# Patient Record
Sex: MALE | Race: WHITE | Employment: FULL TIME | URBAN - METROPOLITAN AREA
[De-identification: names, ages, dates, MRNs, and addresses within clinical notes are randomized per-mention and may not be internally consistent; named-entity substitution may affect disease eponyms.]

---

## 2021-03-03 ENCOUNTER — OFFICE VISIT (OUTPATIENT)
Dept: INTERNAL MEDICINE CLINIC | Facility: CLINIC | Age: 61
End: 2021-03-03
Payer: COMMERCIAL

## 2021-03-03 VITALS
DIASTOLIC BLOOD PRESSURE: 82 MMHG | HEIGHT: 71 IN | SYSTOLIC BLOOD PRESSURE: 146 MMHG | BODY MASS INDEX: 33.18 KG/M2 | RESPIRATION RATE: 20 BRPM | HEART RATE: 112 BPM | WEIGHT: 237 LBS

## 2021-03-03 DIAGNOSIS — L30.9 DERMATITIS: ICD-10-CM

## 2021-03-03 DIAGNOSIS — E78.5 HYPERLIPIDEMIA, UNSPECIFIED HYPERLIPIDEMIA TYPE: ICD-10-CM

## 2021-03-03 DIAGNOSIS — Z12.11 COLON CANCER SCREENING: ICD-10-CM

## 2021-03-03 DIAGNOSIS — Z00.00 ROUTINE PHYSICAL EXAMINATION: Primary | ICD-10-CM

## 2021-03-03 DIAGNOSIS — Z13.6 SCREENING FOR HEART DISEASE: ICD-10-CM

## 2021-03-03 DIAGNOSIS — R03.0 ELEVATED BLOOD PRESSURE READING: ICD-10-CM

## 2021-03-03 PROCEDURE — 3077F SYST BP >= 140 MM HG: CPT | Performed by: INTERNAL MEDICINE

## 2021-03-03 PROCEDURE — 99386 PREV VISIT NEW AGE 40-64: CPT | Performed by: INTERNAL MEDICINE

## 2021-03-03 PROCEDURE — 3008F BODY MASS INDEX DOCD: CPT | Performed by: INTERNAL MEDICINE

## 2021-03-03 PROCEDURE — 3079F DIAST BP 80-89 MM HG: CPT | Performed by: INTERNAL MEDICINE

## 2021-03-03 RX ORDER — CLOTRIMAZOLE AND BETAMETHASONE DIPROPIONATE 10; .64 MG/G; MG/G
1 CREAM TOPICAL 2 TIMES DAILY PRN
Qty: 45 G | Refills: 3 | Status: SHIPPED | OUTPATIENT
Start: 2021-03-03 | End: 2021-11-05

## 2021-03-03 NOTE — PROGRESS NOTES
HPI:    Patient ID: Shivani Augustine is a 56year old male. HPI  Patient is here requesting a physical exam.  I last saw him in 2015.   At that time blood work showed mild elevated blood sugar but considerable hyperlipidemia with a total cholesterol 289 and fever.   HENT: Negative for hearing loss. Eyes: Negative for visual disturbance. Respiratory: Negative for cough and shortness of breath. Cardiovascular: Negative for chest pain and palpitations.    Gastrointestinal: Negative for nausea, vomiting, a inguinal adenopathy present. Neurological: He is alert. No cranial nerve deficit. Coordination normal.   Skin: Skin is warm and dry. Rash noted. Both hands are dried with cracking and redness and scaliness. Red dry rash medial side of both calves.    P no symptoms. Check cardiac calcium scan to look for asymptomatic calcium buildup in the coronary arteries. - CT CALCIUM SCORING; Future    6. Colon cancer screening  Discussed the importance of colon cancer screening.   We will need to get through all the

## 2021-03-17 ENCOUNTER — LAB ENCOUNTER (OUTPATIENT)
Dept: LAB | Facility: HOSPITAL | Age: 61
End: 2021-03-17
Attending: INTERNAL MEDICINE
Payer: COMMERCIAL

## 2021-03-17 DIAGNOSIS — Z00.00 ROUTINE PHYSICAL EXAMINATION: ICD-10-CM

## 2021-03-17 LAB
ALBUMIN SERPL-MCNC: 4.2 G/DL (ref 3.4–5)
ALBUMIN/GLOB SERPL: 1.2 {RATIO} (ref 1–2)
ALP LIVER SERPL-CCNC: 53 U/L
ALT SERPL-CCNC: 35 U/L
ANION GAP SERPL CALC-SCNC: 3 MMOL/L (ref 0–18)
AST SERPL-CCNC: 14 U/L (ref 15–37)
BASOPHILS # BLD AUTO: 0.07 X10(3) UL (ref 0–0.2)
BASOPHILS NFR BLD AUTO: 0.8 %
BILIRUB SERPL-MCNC: 0.4 MG/DL (ref 0.1–2)
BUN BLD-MCNC: 13 MG/DL (ref 7–18)
BUN/CREAT SERPL: 13.4 (ref 10–20)
CALCIUM BLD-MCNC: 8.9 MG/DL (ref 8.5–10.1)
CHLORIDE SERPL-SCNC: 108 MMOL/L (ref 98–112)
CHOLEST SMN-MCNC: 258 MG/DL (ref ?–200)
CO2 SERPL-SCNC: 30 MMOL/L (ref 21–32)
COMPLEXED PSA SERPL-MCNC: 1.13 NG/ML (ref ?–4)
CREAT BLD-MCNC: 0.97 MG/DL
DEPRECATED RDW RBC AUTO: 44.2 FL (ref 35.1–46.3)
EOSINOPHIL # BLD AUTO: 0.26 X10(3) UL (ref 0–0.7)
EOSINOPHIL NFR BLD AUTO: 3.1 %
ERYTHROCYTE [DISTWIDTH] IN BLOOD BY AUTOMATED COUNT: 13.1 % (ref 11–15)
GLOBULIN PLAS-MCNC: 3.6 G/DL (ref 2.8–4.4)
GLUCOSE BLD-MCNC: 100 MG/DL (ref 70–99)
HCT VFR BLD AUTO: 49.3 %
HDLC SERPL-MCNC: 38 MG/DL (ref 40–59)
HGB BLD-MCNC: 16.4 G/DL
IMM GRANULOCYTES # BLD AUTO: 0.04 X10(3) UL (ref 0–1)
IMM GRANULOCYTES NFR BLD: 0.5 %
LDLC SERPL CALC-MCNC: 191 MG/DL (ref ?–100)
LYMPHOCYTES # BLD AUTO: 1.84 X10(3) UL (ref 1–4)
LYMPHOCYTES NFR BLD AUTO: 21.6 %
M PROTEIN MFR SERPL ELPH: 7.8 G/DL (ref 6.4–8.2)
MCH RBC QN AUTO: 30.5 PG (ref 26–34)
MCHC RBC AUTO-ENTMCNC: 33.3 G/DL (ref 31–37)
MCV RBC AUTO: 91.6 FL
MONOCYTES # BLD AUTO: 0.77 X10(3) UL (ref 0.1–1)
MONOCYTES NFR BLD AUTO: 9 %
NEUTROPHILS # BLD AUTO: 5.53 X10 (3) UL (ref 1.5–7.7)
NEUTROPHILS # BLD AUTO: 5.53 X10(3) UL (ref 1.5–7.7)
NEUTROPHILS NFR BLD AUTO: 65 %
NONHDLC SERPL-MCNC: 220 MG/DL (ref ?–130)
OSMOLALITY SERPL CALC.SUM OF ELEC: 292 MOSM/KG (ref 275–295)
PATIENT FASTING Y/N/NP: YES
PATIENT FASTING Y/N/NP: YES
PLATELET # BLD AUTO: 277 10(3)UL (ref 150–450)
POTASSIUM SERPL-SCNC: 4.3 MMOL/L (ref 3.5–5.1)
RBC # BLD AUTO: 5.38 X10(6)UL
SODIUM SERPL-SCNC: 141 MMOL/L (ref 136–145)
TRIGL SERPL-MCNC: 144 MG/DL (ref 30–149)
TSI SER-ACNC: 2.04 MIU/ML (ref 0.36–3.74)
VLDLC SERPL CALC-MCNC: 29 MG/DL (ref 0–30)
WBC # BLD AUTO: 8.5 X10(3) UL (ref 4–11)

## 2021-03-17 PROCEDURE — 36415 COLL VENOUS BLD VENIPUNCTURE: CPT

## 2021-03-17 PROCEDURE — 84443 ASSAY THYROID STIM HORMONE: CPT

## 2021-03-17 PROCEDURE — 80053 COMPREHEN METABOLIC PANEL: CPT

## 2021-03-17 PROCEDURE — 80061 LIPID PANEL: CPT

## 2021-03-17 PROCEDURE — 85025 COMPLETE CBC W/AUTO DIFF WBC: CPT

## 2021-03-18 DIAGNOSIS — E78.5 HYPERLIPIDEMIA, UNSPECIFIED HYPERLIPIDEMIA TYPE: Primary | ICD-10-CM

## 2021-03-18 RX ORDER — ATORVASTATIN CALCIUM 10 MG/1
10 TABLET, FILM COATED ORAL NIGHTLY
Qty: 30 TABLET | Refills: 3 | Status: SHIPPED | OUTPATIENT
Start: 2021-03-18 | End: 2021-06-21

## 2021-03-24 ENCOUNTER — EKG ENCOUNTER (OUTPATIENT)
Dept: LAB | Facility: HOSPITAL | Age: 61
End: 2021-03-24
Attending: INTERNAL MEDICINE
Payer: COMMERCIAL

## 2021-03-24 DIAGNOSIS — Z00.00 ROUTINE GENERAL MEDICAL EXAMINATION AT A HEALTH CARE FACILITY: Primary | ICD-10-CM

## 2021-03-24 PROCEDURE — 93010 ELECTROCARDIOGRAM REPORT: CPT | Performed by: INTERNAL MEDICINE

## 2021-03-24 PROCEDURE — 93005 ELECTROCARDIOGRAM TRACING: CPT

## 2021-06-16 ENCOUNTER — LAB ENCOUNTER (OUTPATIENT)
Dept: LAB | Facility: HOSPITAL | Age: 61
End: 2021-06-16
Attending: NURSE PRACTITIONER
Payer: COMMERCIAL

## 2021-06-16 DIAGNOSIS — E78.5 HYPERLIPIDEMIA, UNSPECIFIED HYPERLIPIDEMIA TYPE: ICD-10-CM

## 2021-06-16 DIAGNOSIS — Z00.00 ROUTINE GENERAL MEDICAL EXAMINATION AT A HEALTH CARE FACILITY: Primary | ICD-10-CM

## 2021-06-16 PROCEDURE — 93005 ELECTROCARDIOGRAM TRACING: CPT

## 2021-06-16 PROCEDURE — 80053 COMPREHEN METABOLIC PANEL: CPT

## 2021-06-16 PROCEDURE — 36415 COLL VENOUS BLD VENIPUNCTURE: CPT

## 2021-06-16 PROCEDURE — 93010 ELECTROCARDIOGRAM REPORT: CPT | Performed by: INTERNAL MEDICINE

## 2021-06-16 PROCEDURE — 80061 LIPID PANEL: CPT

## 2021-06-17 ENCOUNTER — PATIENT MESSAGE (OUTPATIENT)
Dept: INTERNAL MEDICINE CLINIC | Facility: CLINIC | Age: 61
End: 2021-06-17

## 2021-06-17 DIAGNOSIS — E78.5 HYPERLIPIDEMIA, UNSPECIFIED HYPERLIPIDEMIA TYPE: Primary | ICD-10-CM

## 2021-06-17 NOTE — TELEPHONE ENCOUNTER
Alessia Olson: please review. Please advise when he should have lipid panel? Or if it can be added to specimen?

## 2021-06-17 NOTE — TELEPHONE ENCOUNTER
From: Raudel Sorto  To: Tera Ayoub MD  Sent: 6/17/2021 9:40 AM CDT  Subject: Test Results Question    When I had the lab work done yesterday, I thought it would include the cholesterol results too.  Is that a separate test? I just completed 3 months

## 2021-06-17 NOTE — TELEPHONE ENCOUNTER
Please call the lab and make sure a lipid panel was added.  The order was placed    Merlin Pereyra, ANP  Working with [facility] [place]

## 2021-06-21 RX ORDER — ATORVASTATIN CALCIUM 10 MG/1
10 TABLET, FILM COATED ORAL NIGHTLY
Qty: 30 TABLET | Refills: 3 | Status: SHIPPED | OUTPATIENT
Start: 2021-06-21 | End: 2021-10-06

## 2021-06-21 NOTE — TELEPHONE ENCOUNTER
•  atorvastatin 10 MG Oral Tab, Take 1 tablet (10 mg total) by mouth nightly., Disp: 30 tablet, Rfl: 3    Message from Pharmacy:  Pt is requesting a 90 day supply

## 2021-06-21 NOTE — TELEPHONE ENCOUNTER
Refill passed per [place] [employer] [place] protocol.   Cholesterol Medications  Protocol Criteria:  Appointment scheduled in the past 12 months or in the next 3 months  ALT & LDL on file in the past 12 months  ALT result < 80  LDL result <130   Recent Outpatient Vis

## 2021-07-14 ENCOUNTER — HOSPITAL ENCOUNTER (OUTPATIENT)
Dept: CT IMAGING | Facility: HOSPITAL | Age: 61
Discharge: HOME | End: 2021-07-14
Attending: INTERNAL MEDICINE

## 2021-07-14 DIAGNOSIS — Z13.6 SCREENING FOR HEART DISEASE: ICD-10-CM

## 2021-10-06 RX ORDER — ATORVASTATIN CALCIUM 10 MG/1
10 TABLET, FILM COATED ORAL NIGHTLY
Qty: 90 TABLET | Refills: 1 | Status: SHIPPED | OUTPATIENT
Start: 2021-10-06 | End: 2022-04-05

## 2021-10-06 NOTE — TELEPHONE ENCOUNTER
Refill passed per [place] [employer] [facility] protocol.       Requested Prescriptions   Pending Prescriptions Disp Refills    ATORVASTATIN 10 MG Oral Tab [Pharmacy Med Name: ATORVASTATIN 10MG TABLETS] 90 tablet 0     Sig: TAKE 1 TABLET(10 MG) BY MOUTH EVERY NIGHT        Cholesterol Medication Protocol Passed - 10/6/2021  3:35 PM        Passed - ALT in past 12 months        Passed - LDL in past 12 months        Passed - Last ALT < 80       Lab Results   Component Value Date    ALT 55 06/16/2021             Passed - Last LDL < 130     Lab Results   Component Value Date     (H) 06/16/2021               Passed - Appointment in past 12 or next 3 months                   Recent Outpatient Visits              7 months ago Routine physical examination    Adalberto Baker MD    Office Visit    6 years ago Routine physical examination    [place], [place], Skyler Edwards MD    Office Visit

## 2021-11-05 RX ORDER — CLOTRIMAZOLE AND BETAMETHASONE DIPROPIONATE 10; .64 MG/G; MG/G
CREAM TOPICAL
Qty: 45 G | Refills: 3 | Status: SHIPPED | OUTPATIENT
Start: 2021-11-05

## 2021-12-22 ENCOUNTER — APPOINTMENT (OUTPATIENT)
Dept: CT IMAGING | Facility: HOSPITAL | Age: 61
End: 2021-12-22
Attending: EMERGENCY MEDICINE
Payer: COMMERCIAL

## 2021-12-22 ENCOUNTER — HOSPITAL ENCOUNTER (EMERGENCY)
Facility: HOSPITAL | Age: 61
Discharge: HOME | End: 2021-12-22
Attending: EMERGENCY MEDICINE
Payer: COMMERCIAL

## 2021-12-22 VITALS
DIASTOLIC BLOOD PRESSURE: 70 MMHG | TEMPERATURE: 98 F | SYSTOLIC BLOOD PRESSURE: 134 MMHG | RESPIRATION RATE: 20 BRPM | HEART RATE: 101 BPM | WEIGHT: 230 LBS | OXYGEN SATURATION: 95 % | BODY MASS INDEX: 32 KG/M2

## 2021-12-22 DIAGNOSIS — N20.1 LEFT URETERAL STONE: Primary | ICD-10-CM

## 2021-12-22 PROCEDURE — 74176 CT ABD & PELVIS W/O CONTRAST: CPT | Performed by: EMERGENCY MEDICINE

## 2021-12-22 PROCEDURE — 80048 BASIC METABOLIC PNL TOTAL CA: CPT

## 2021-12-22 PROCEDURE — 96375 TX/PRO/DX INJ NEW DRUG ADDON: CPT

## 2021-12-22 PROCEDURE — 85025 COMPLETE CBC W/AUTO DIFF WBC: CPT

## 2021-12-22 PROCEDURE — 80048 BASIC METABOLIC PNL TOTAL CA: CPT | Performed by: EMERGENCY MEDICINE

## 2021-12-22 PROCEDURE — 81001 URINALYSIS AUTO W/SCOPE: CPT | Performed by: EMERGENCY MEDICINE

## 2021-12-22 PROCEDURE — 96374 THER/PROPH/DIAG INJ IV PUSH: CPT

## 2021-12-22 PROCEDURE — 96361 HYDRATE IV INFUSION ADD-ON: CPT

## 2021-12-22 PROCEDURE — 99284 EMERGENCY DEPT VISIT MOD MDM: CPT

## 2021-12-22 PROCEDURE — 85025 COMPLETE CBC W/AUTO DIFF WBC: CPT | Performed by: EMERGENCY MEDICINE

## 2021-12-22 RX ORDER — KETOROLAC TROMETHAMINE 15 MG/ML
15 INJECTION, SOLUTION INTRAMUSCULAR; INTRAVENOUS ONCE
Status: COMPLETED | OUTPATIENT
Start: 2021-12-22 | End: 2021-12-22

## 2021-12-22 RX ORDER — ONDANSETRON 2 MG/ML
INJECTION INTRAMUSCULAR; INTRAVENOUS
Status: COMPLETED
Start: 2021-12-22 | End: 2021-12-22

## 2021-12-22 RX ORDER — KETOROLAC TROMETHAMINE 15 MG/ML
INJECTION, SOLUTION INTRAMUSCULAR; INTRAVENOUS
Status: COMPLETED
Start: 2021-12-22 | End: 2021-12-22

## 2021-12-22 RX ORDER — ONDANSETRON 2 MG/ML
4 INJECTION INTRAMUSCULAR; INTRAVENOUS ONCE
Status: COMPLETED | OUTPATIENT
Start: 2021-12-22 | End: 2021-12-22

## 2021-12-22 RX ORDER — TRAMADOL HYDROCHLORIDE 50 MG/1
TABLET ORAL EVERY 6 HOURS PRN
Qty: 10 TABLET | Refills: 0 | Status: SHIPPED | OUTPATIENT
Start: 2021-12-22 | End: 2021-12-27

## 2021-12-22 NOTE — ED INITIAL ASSESSMENT (HPI)
Patient complains of left sided flank pain since this am. Hx kidney stones. Associated with nausea. Denies urinary complaints.

## 2021-12-22 NOTE — ED PROVIDER NOTES
Patient Seen in: [facility] AND [facility] Emergency Department    History   Patient presents with:  Abdomen/Flank Pain      HPI    58-year-old male presents the ER with complaint of left lower quadrant pain. Patient states the pain started early last night.   Trisha Manning including droplet mask, eye protection, and gloves were worn throughout the duration of the exam.  Handwashing was performed prior to and after the exam.  Stethoscope and any equipment used during my examination was cleaned with super sani-cloth germicidal 2.0 (*)     RBC Urine >10 (*)     Squamous Epi.  Cells Few (*)     All other components within normal limits   CBC W/ DIFFERENTIAL - Abnormal; Notable for the following components:    WBC 13.3 (*)     Neutrophil Absolute Prelim 11.11 (*)     [facility]rophil [facility]so [facility]) injection 4 mg (4 mg Intravenous Given 12/22/21 1305)   ketorolac (TORADOL) injection 15 mg (15 mg Intravenous Given 12/22/21 1421)   sodium chloride 0.9% IV bolus 1,000 mL (1,000 mL Intravenous New Bag 12/22/21 1427)         MDM      12/22/21  12

## 2021-12-23 ENCOUNTER — HOSPITAL ENCOUNTER (EMERGENCY)
Facility: HOSPITAL | Age: 61
Discharge: HOME | End: 2021-12-24
Attending: EMERGENCY MEDICINE
Payer: COMMERCIAL

## 2021-12-23 DIAGNOSIS — N23 RENAL COLIC: ICD-10-CM

## 2021-12-23 DIAGNOSIS — N20.1 URETEROLITHIASIS: Primary | ICD-10-CM

## 2021-12-23 PROCEDURE — 96374 THER/PROPH/DIAG INJ IV PUSH: CPT

## 2021-12-23 PROCEDURE — 96375 TX/PRO/DX INJ NEW DRUG ADDON: CPT

## 2021-12-23 PROCEDURE — 99284 EMERGENCY DEPT VISIT MOD MDM: CPT

## 2021-12-23 RX ORDER — MORPHINE SULFATE 4 MG/ML
4 INJECTION, SOLUTION INTRAMUSCULAR; INTRAVENOUS ONCE
Status: COMPLETED | OUTPATIENT
Start: 2021-12-23 | End: 2021-12-24

## 2021-12-23 RX ORDER — KETOROLAC TROMETHAMINE 15 MG/ML
15 INJECTION, SOLUTION INTRAMUSCULAR; INTRAVENOUS ONCE
Status: COMPLETED | OUTPATIENT
Start: 2021-12-23 | End: 2021-12-24

## 2021-12-24 VITALS
RESPIRATION RATE: 18 BRPM | WEIGHT: 230 LBS | OXYGEN SATURATION: 94 % | DIASTOLIC BLOOD PRESSURE: 78 MMHG | BODY MASS INDEX: 32.2 KG/M2 | TEMPERATURE: 98 F | HEART RATE: 99 BPM | HEIGHT: 71 IN | SYSTOLIC BLOOD PRESSURE: 143 MMHG

## 2021-12-24 RX ORDER — HYDROCODONE BITARTRATE AND ACETAMINOPHEN 5; 325 MG/1; MG/1
2 TABLET ORAL ONCE
Status: COMPLETED | OUTPATIENT
Start: 2021-12-24 | End: 2021-12-24

## 2021-12-24 RX ORDER — HYDROCODONE BITARTRATE AND ACETAMINOPHEN 5; 325 MG/1; MG/1
1-2 TABLET ORAL EVERY 6 HOURS PRN
Qty: 20 TABLET | Refills: 0 | Status: SHIPPED | OUTPATIENT
Start: 2021-12-24

## 2021-12-24 RX ORDER — ONDANSETRON 2 MG/ML
4 INJECTION INTRAMUSCULAR; INTRAVENOUS ONCE
Status: COMPLETED | OUTPATIENT
Start: 2021-12-24 | End: 2021-12-24

## 2021-12-24 RX ORDER — KETOROLAC TROMETHAMINE 10 MG/1
10 TABLET, FILM COATED ORAL EVERY 6 HOURS PRN
Qty: 20 TABLET | Refills: 0 | Status: SHIPPED | OUTPATIENT
Start: 2021-12-24 | End: 2021-12-29

## 2021-12-24 NOTE — ED INITIAL ASSESSMENT (HPI)
Pt was in ER yesterday and diagnosed with kidney stones. Was sent home with pain medication however is back d/t not being able to tolerate pain.     Pt also states that he has the urge to urinate however only goes a little every time he goes

## 2021-12-24 NOTE — ED QUICK NOTES
Patient provided with discharge instructions. Verbalized understanding for plan of care at home and follow up. All question and concerns addressed prior to discharge. Iv removed, catheter intact. Let pt know rx was sent to pharmacy.

## 2021-12-25 NOTE — ED PROVIDER NOTES
Patient Seen in: [facility] AND [facility] Emergency Department    History   Patient presents with:  Abdomen/Flank Pain      HPI    The patient presents to the ED complaining of unbearable kidney stone pain.   Seen yesterday for initial symptoms and was diagnosed including droplet mask, eye protection, and gloves were worn throughout the duration of the exam.  Handwashing was performed prior to and after the exam.  Stethoscope and any equipment used during my examination was cleaned with super sani-cloth germicidal Oral      SpO2: 92% 93% 92% 94%   Weight: 104.3 kg      Height: 180.3 cm (5' 11\")        *I personally reviewed and interpreted all ED vitals.     Pulse Ox: 94%, Room air, Normal     Monitor Interpretation:   normal sinus rhythm, sinus tachycardia    Diffe

## 2022-04-05 RX ORDER — ATORVASTATIN CALCIUM 10 MG/1
TABLET, FILM COATED ORAL
Qty: 90 TABLET | Refills: 0 | Status: SHIPPED | OUTPATIENT
Start: 2022-04-05 | End: 2022-07-04

## 2022-05-02 ENCOUNTER — OFFICE VISIT (OUTPATIENT)
Dept: INTERNAL MEDICINE CLINIC | Facility: CLINIC | Age: 62
End: 2022-05-02
Payer: COMMERCIAL

## 2022-05-02 ENCOUNTER — LAB ENCOUNTER (OUTPATIENT)
Dept: LAB | Facility: HOSPITAL | Age: 62
End: 2022-05-02
Attending: INTERNAL MEDICINE
Payer: COMMERCIAL

## 2022-05-02 VITALS
BODY MASS INDEX: 33.04 KG/M2 | TEMPERATURE: 99 F | WEIGHT: 236 LBS | HEART RATE: 110 BPM | DIASTOLIC BLOOD PRESSURE: 68 MMHG | RESPIRATION RATE: 20 BRPM | HEIGHT: 71 IN | SYSTOLIC BLOOD PRESSURE: 152 MMHG

## 2022-05-02 DIAGNOSIS — E78.5 HYPERLIPIDEMIA, UNSPECIFIED HYPERLIPIDEMIA TYPE: ICD-10-CM

## 2022-05-02 DIAGNOSIS — Z00.00 ROUTINE PHYSICAL EXAMINATION: ICD-10-CM

## 2022-05-02 DIAGNOSIS — Z12.11 COLON CANCER SCREENING: ICD-10-CM

## 2022-05-02 DIAGNOSIS — F17.200 TOBACCO USE DISORDER: ICD-10-CM

## 2022-05-02 DIAGNOSIS — Z00.00 ROUTINE PHYSICAL EXAMINATION: Primary | ICD-10-CM

## 2022-05-02 DIAGNOSIS — R03.0 ELEVATED BLOOD PRESSURE READING: ICD-10-CM

## 2022-05-02 LAB
ALBUMIN SERPL-MCNC: 4.1 G/DL (ref 3.4–5)
ALBUMIN/GLOB SERPL: 0.9 {RATIO} (ref 1–2)
ALP LIVER SERPL-CCNC: 43 U/L
ALT SERPL-CCNC: 44 U/L
ANION GAP SERPL CALC-SCNC: 4 MMOL/L (ref 0–18)
AST SERPL-CCNC: 18 U/L (ref 15–37)
BASOPHILS # BLD AUTO: 0.07 X10(3) UL (ref 0–0.2)
BASOPHILS NFR BLD AUTO: 0.9 %
BILIRUB SERPL-MCNC: 0.4 MG/DL (ref 0.1–2)
BUN BLD-MCNC: 11 MG/DL (ref 7–18)
BUN/CREAT SERPL: 10.6 (ref 10–20)
CALCIUM BLD-MCNC: 9.4 MG/DL (ref 8.5–10.1)
CHLORIDE SERPL-SCNC: 107 MMOL/L (ref 98–112)
CHOLEST SERPL-MCNC: 198 MG/DL (ref ?–200)
CO2 SERPL-SCNC: 30 MMOL/L (ref 21–32)
COMPLEXED PSA SERPL-MCNC: 2.2 NG/ML (ref ?–4)
CREAT BLD-MCNC: 1.04 MG/DL
DEPRECATED RDW RBC AUTO: 43.4 FL (ref 35.1–46.3)
EOSINOPHIL # BLD AUTO: 0.16 X10(3) UL (ref 0–0.7)
EOSINOPHIL NFR BLD AUTO: 2.1 %
ERYTHROCYTE [DISTWIDTH] IN BLOOD BY AUTOMATED COUNT: 12.6 % (ref 11–15)
FASTING PATIENT LIPID ANSWER: YES
FASTING STATUS PATIENT QL REPORTED: YES
GLOBULIN PLAS-MCNC: 4.5 G/DL (ref 2.8–4.4)
GLUCOSE BLD-MCNC: 106 MG/DL (ref 70–99)
HCT VFR BLD AUTO: 48.8 %
HDLC SERPL-MCNC: 40 MG/DL (ref 40–59)
HGB BLD-MCNC: 16.1 G/DL
IMM GRANULOCYTES # BLD AUTO: 0.03 X10(3) UL (ref 0–1)
IMM GRANULOCYTES NFR BLD: 0.4 %
LDLC SERPL CALC-MCNC: 133 MG/DL (ref ?–100)
LYMPHOCYTES # BLD AUTO: 1.87 X10(3) UL (ref 1–4)
LYMPHOCYTES NFR BLD AUTO: 25.1 %
MCH RBC QN AUTO: 30.6 PG (ref 26–34)
MCHC RBC AUTO-ENTMCNC: 33 G/DL (ref 31–37)
MCV RBC AUTO: 92.6 FL
MONOCYTES # BLD AUTO: 0.87 X10(3) UL (ref 0.1–1)
MONOCYTES NFR BLD AUTO: 11.7 %
NEUTROPHILS # BLD AUTO: 4.46 X10 (3) UL (ref 1.5–7.7)
NEUTROPHILS # BLD AUTO: 4.46 X10(3) UL (ref 1.5–7.7)
NEUTROPHILS NFR BLD AUTO: 59.8 %
NONHDLC SERPL-MCNC: 158 MG/DL (ref ?–130)
OSMOLALITY SERPL CALC.SUM OF ELEC: 292 MOSM/KG (ref 275–295)
PLATELET # BLD AUTO: 269 10(3)UL (ref 150–450)
POTASSIUM SERPL-SCNC: 4.7 MMOL/L (ref 3.5–5.1)
PROT SERPL-MCNC: 8.6 G/DL (ref 6.4–8.2)
RBC # BLD AUTO: 5.27 X10(6)UL
SODIUM SERPL-SCNC: 141 MMOL/L (ref 136–145)
TRIGL SERPL-MCNC: 141 MG/DL (ref 30–149)
TSI SER-ACNC: 2.66 MIU/ML (ref 0.36–3.74)
VIT B12 SERPL-MCNC: 290 PG/ML (ref 193–986)
VLDLC SERPL CALC-MCNC: 26 MG/DL (ref 0–30)
WBC # BLD AUTO: 7.5 X10(3) UL (ref 4–11)

## 2022-05-02 PROCEDURE — 85025 COMPLETE CBC W/AUTO DIFF WBC: CPT

## 2022-05-02 PROCEDURE — 82607 VITAMIN B-12: CPT

## 2022-05-02 PROCEDURE — 3077F SYST BP >= 140 MM HG: CPT | Performed by: INTERNAL MEDICINE

## 2022-05-02 PROCEDURE — 80053 COMPREHEN METABOLIC PANEL: CPT

## 2022-05-02 PROCEDURE — 84443 ASSAY THYROID STIM HORMONE: CPT

## 2022-05-02 PROCEDURE — 80061 LIPID PANEL: CPT

## 2022-05-02 PROCEDURE — 3078F DIAST BP <80 MM HG: CPT | Performed by: INTERNAL MEDICINE

## 2022-05-02 PROCEDURE — 3008F BODY MASS INDEX DOCD: CPT | Performed by: INTERNAL MEDICINE

## 2022-05-02 PROCEDURE — 99396 PREV VISIT EST AGE 40-64: CPT | Performed by: INTERNAL MEDICINE

## 2022-05-02 PROCEDURE — 36415 COLL VENOUS BLD VENIPUNCTURE: CPT

## 2022-05-02 RX ORDER — CLOTRIMAZOLE AND BETAMETHASONE DIPROPIONATE 10; .64 MG/G; MG/G
CREAM TOPICAL
Qty: 45 G | Refills: 3 | Status: SHIPPED | OUTPATIENT
Start: 2022-05-02

## 2022-07-04 RX ORDER — ATORVASTATIN CALCIUM 10 MG/1
TABLET, FILM COATED ORAL
Qty: 90 TABLET | Refills: 1 | Status: SHIPPED | OUTPATIENT
Start: 2022-07-04

## 2022-08-02 ENCOUNTER — NURSE ONLY (OUTPATIENT)
Dept: GASTROENTEROLOGY | Facility: CLINIC | Age: 62
End: 2022-08-02

## 2022-08-02 DIAGNOSIS — Z12.11 SCREEN FOR COLON CANCER: Primary | ICD-10-CM

## 2022-08-02 RX ORDER — POLYETHYLENE GLYCOL 3350, SODIUM CHLORIDE, SODIUM BICARBONATE, POTASSIUM CHLORIDE 420; 11.2; 5.72; 1.48 G/4L; G/4L; G/4L; G/4L
POWDER, FOR SOLUTION ORAL
Qty: 4000 ML | Refills: 0 | Status: SHIPPED | OUTPATIENT
Start: 2022-08-02

## 2022-08-02 NOTE — PROGRESS NOTES
Caron Hint patient for a scheduled telephone colon screening. GI MD preference: none  Insurance:  BCBS  CBC from: 5/2/2022  PCP visit on: 5/2/2022    First colonoscopy: yes     Anticoagulants: no   Diabetic Meds:  No   BP meds(Ace inhibitors/ARB's): no  Weight loss meds (phentermine/vyvanse): no   Iron supplement (RX/OTC): no   Height & Weight/BMI: 5'11\"/236lbs/32  Hx of Cardiac/CVA issues/(MI/Stroke): no  Devices Pacemaker/Defibrillator/Stents: no   Resp. Issues/Oxygen Use/CARLOTTA/COPD: no   Issues w/Anesthesia: no     Symptoms (Y/N): no     Family history of colon cancer: no     Medications, pharmacy, and allergies verified with patient over the phone. Please advise on orders and prep, thank you.

## 2022-08-02 NOTE — PROGRESS NOTES
Scheduling:  cln w/ august w/ Dr. Aakash Stratton or Dr. Painter Batch  Dx: crc screening  trilyte split dose sent e-scribe

## 2022-08-03 NOTE — PROGRESS NOTES
Scheduled for:  Colonoscopy-screen 63480    Provider Name:  Dr. Belem Balderas  Date:  10/4/2022  Location:  [facility]  Sedation:  MAC  Time:  10:45 AM Patient made aware [facility] will call the day before with an arrival time. Prep:  Split dose trilyte E-Prescribing Status: Receipt confirmed by pharmacy (8/2/2022 12:15 PM CDT)  Meds/Allergies Reconciled?:  ronny Lin reviewed   Diagnosis with codes:  Colorectal cancer screening Z12.11  Was patient informed to call insurance with codes (Y/N):  I confirmed GVISP 1 with this patient. Referral sent?:  Referral was sent. [facility] or [address] notified?:  Electronic case request was sent to [facility] via [employer]. Medication Orders:  n/a  Misc Orders:  Patient was informed that they will need a COVID 19 test prior to their procedure. Patient verbally understood & will await a phone call from [facility] to schedule. Further instructions given by staff:  I discussed the prep instructions with the patient which he verbally understood and is aware that I will send the instructions today via [address]

## 2022-10-04 ENCOUNTER — LAB REQUISITION (OUTPATIENT)
Dept: SURGERY | Age: 62
End: 2022-10-04
Payer: COMMERCIAL

## 2022-10-04 ENCOUNTER — SURGERY CENTER DOCUMENTATION (OUTPATIENT)
Dept: SURGERY | Age: 62
End: 2022-10-04

## 2022-10-04 DIAGNOSIS — Z12.11 SPECIAL SCREENING FOR MALIGNANT NEOPLASMS, COLON: ICD-10-CM

## 2022-10-04 PROCEDURE — 45380 COLONOSCOPY AND BIOPSY: CPT | Performed by: INTERNAL MEDICINE

## 2022-10-04 PROCEDURE — 45385 COLONOSCOPY W/LESION REMOVAL: CPT | Performed by: INTERNAL MEDICINE

## 2022-10-04 PROCEDURE — 88305 TISSUE EXAM BY PATHOLOGIST: CPT | Performed by: INTERNAL MEDICINE

## 2022-10-05 ENCOUNTER — TELEPHONE (OUTPATIENT)
Dept: GASTROENTEROLOGY | Facility: CLINIC | Age: 62
End: 2022-10-05

## 2022-10-05 NOTE — TELEPHONE ENCOUNTER
Entered into Epic. Recall CLN in 3 years per Dr. Brice Duenas. Last CLN done 10/04/2022. Recall entered into Patient Outreach for 10/04/2025. Health Maintenance updated.

## 2022-10-05 NOTE — TELEPHONE ENCOUNTER
----- Message from Joe Mejía MD sent at 10/5/2022  3:33 PM CDT -----  GI staff: please place recall for colonoscopy in 3 years

## 2023-06-12 ENCOUNTER — OFFICE VISIT (OUTPATIENT)
Facility: CLINIC | Age: 63
End: 2023-06-12

## 2023-06-12 VITALS
TEMPERATURE: 99 F | RESPIRATION RATE: 20 BRPM | HEART RATE: 116 BPM | DIASTOLIC BLOOD PRESSURE: 82 MMHG | WEIGHT: 238 LBS | HEIGHT: 71 IN | BODY MASS INDEX: 33.32 KG/M2 | SYSTOLIC BLOOD PRESSURE: 150 MMHG

## 2023-06-12 DIAGNOSIS — Z00.00 ROUTINE PHYSICAL EXAMINATION: Primary | ICD-10-CM

## 2023-06-12 DIAGNOSIS — I10 ESSENTIAL HYPERTENSION: ICD-10-CM

## 2023-06-12 DIAGNOSIS — D12.6 ADENOMATOUS POLYP OF COLON, UNSPECIFIED PART OF COLON: ICD-10-CM

## 2023-06-12 DIAGNOSIS — E78.5 HYPERLIPIDEMIA, UNSPECIFIED HYPERLIPIDEMIA TYPE: ICD-10-CM

## 2023-06-12 DIAGNOSIS — F17.200 TOBACCO USE DISORDER: ICD-10-CM

## 2023-06-12 PROCEDURE — 99396 PREV VISIT EST AGE 40-64: CPT | Performed by: INTERNAL MEDICINE

## 2023-06-12 PROCEDURE — 3077F SYST BP >= 140 MM HG: CPT | Performed by: INTERNAL MEDICINE

## 2023-06-12 PROCEDURE — 3008F BODY MASS INDEX DOCD: CPT | Performed by: INTERNAL MEDICINE

## 2023-06-12 PROCEDURE — 3079F DIAST BP 80-89 MM HG: CPT | Performed by: INTERNAL MEDICINE

## 2023-06-12 PROCEDURE — 99213 OFFICE O/P EST LOW 20 MIN: CPT | Performed by: INTERNAL MEDICINE

## 2023-06-12 RX ORDER — LISINOPRIL 10 MG/1
10 TABLET ORAL DAILY
Qty: 90 TABLET | Refills: 1 | Status: SHIPPED | OUTPATIENT
Start: 2023-06-12

## 2023-06-20 ENCOUNTER — LAB ENCOUNTER (OUTPATIENT)
Dept: LAB | Facility: HOSPITAL | Age: 63
End: 2023-06-20
Attending: INTERNAL MEDICINE
Payer: COMMERCIAL

## 2023-06-20 DIAGNOSIS — Z00.00 ROUTINE PHYSICAL EXAMINATION: ICD-10-CM

## 2023-06-20 DIAGNOSIS — Z00.00 ROUTINE GENERAL MEDICAL EXAMINATION AT A HEALTH CARE FACILITY: Primary | ICD-10-CM

## 2023-06-20 LAB
ALBUMIN SERPL-MCNC: 3.9 G/DL (ref 3.4–5)
ALBUMIN/GLOB SERPL: 1 {RATIO} (ref 1–2)
ALP LIVER SERPL-CCNC: 40 U/L
ALT SERPL-CCNC: 35 U/L
ANION GAP SERPL CALC-SCNC: 5 MMOL/L (ref 0–18)
AST SERPL-CCNC: 15 U/L (ref 15–37)
ATRIAL RATE: 90 BPM
BASOPHILS # BLD AUTO: 0.07 X10(3) UL (ref 0–0.2)
BASOPHILS NFR BLD AUTO: 0.6 %
BILIRUB SERPL-MCNC: 0.5 MG/DL (ref 0.1–2)
BUN BLD-MCNC: 14 MG/DL (ref 7–18)
BUN/CREAT SERPL: 14.4 (ref 10–20)
CALCIUM BLD-MCNC: 9 MG/DL (ref 8.5–10.1)
CHLORIDE SERPL-SCNC: 113 MMOL/L (ref 98–112)
CHOLEST SERPL-MCNC: 169 MG/DL (ref ?–200)
CO2 SERPL-SCNC: 25 MMOL/L (ref 21–32)
COMPLEXED PSA SERPL-MCNC: 1.56 NG/ML (ref ?–4)
CREAT BLD-MCNC: 0.97 MG/DL
DEPRECATED RDW RBC AUTO: 44.1 FL (ref 35.1–46.3)
EOSINOPHIL # BLD AUTO: 0.28 X10(3) UL (ref 0–0.7)
EOSINOPHIL NFR BLD AUTO: 2.3 %
ERYTHROCYTE [DISTWIDTH] IN BLOOD BY AUTOMATED COUNT: 13 % (ref 11–15)
EST. AVERAGE GLUCOSE BLD GHB EST-MCNC: 117 MG/DL (ref 68–126)
FASTING PATIENT LIPID ANSWER: YES
FASTING STATUS PATIENT QL REPORTED: YES
GFR SERPLBLD BASED ON 1.73 SQ M-ARVRAT: 87 ML/MIN/1.73M2 (ref 60–?)
GLOBULIN PLAS-MCNC: 3.9 G/DL (ref 2.8–4.4)
GLUCOSE BLD-MCNC: 112 MG/DL (ref 70–99)
HBA1C MFR BLD: 5.7 % (ref ?–5.7)
HCT VFR BLD AUTO: 48.5 %
HDLC SERPL-MCNC: 39 MG/DL (ref 40–59)
HGB BLD-MCNC: 15.9 G/DL
IMM GRANULOCYTES # BLD AUTO: 0.03 X10(3) UL (ref 0–1)
IMM GRANULOCYTES NFR BLD: 0.2 %
LDLC SERPL CALC-MCNC: 102 MG/DL (ref ?–100)
LYMPHOCYTES # BLD AUTO: 1.91 X10(3) UL (ref 1–4)
LYMPHOCYTES NFR BLD AUTO: 15.4 %
MCH RBC QN AUTO: 30.2 PG (ref 26–34)
MCHC RBC AUTO-ENTMCNC: 32.8 G/DL (ref 31–37)
MCV RBC AUTO: 92 FL
MONOCYTES # BLD AUTO: 1.06 X10(3) UL (ref 0.1–1)
MONOCYTES NFR BLD AUTO: 8.5 %
NEUTROPHILS # BLD AUTO: 9.08 X10 (3) UL (ref 1.5–7.7)
NEUTROPHILS # BLD AUTO: 9.08 X10(3) UL (ref 1.5–7.7)
NEUTROPHILS NFR BLD AUTO: 73 %
NONHDLC SERPL-MCNC: 130 MG/DL (ref ?–130)
OSMOLALITY SERPL CALC.SUM OF ELEC: 297 MOSM/KG (ref 275–295)
P AXIS: 41 DEGREES
P-R INTERVAL: 138 MS
PLATELET # BLD AUTO: 277 10(3)UL (ref 150–450)
POTASSIUM SERPL-SCNC: 4.4 MMOL/L (ref 3.5–5.1)
PROT SERPL-MCNC: 7.8 G/DL (ref 6.4–8.2)
Q-T INTERVAL: 354 MS
QRS DURATION: 94 MS
QTC CALCULATION (BEZET): 433 MS
R AXIS: 53 DEGREES
RBC # BLD AUTO: 5.27 X10(6)UL
SODIUM SERPL-SCNC: 143 MMOL/L (ref 136–145)
T AXIS: 37 DEGREES
TRIGL SERPL-MCNC: 161 MG/DL (ref 30–149)
TSI SER-ACNC: 2.15 MIU/ML (ref 0.36–3.74)
VENTRICULAR RATE: 90 BPM
VLDLC SERPL CALC-MCNC: 27 MG/DL (ref 0–30)
WBC # BLD AUTO: 12.4 X10(3) UL (ref 4–11)

## 2023-06-20 PROCEDURE — 36415 COLL VENOUS BLD VENIPUNCTURE: CPT

## 2023-06-20 PROCEDURE — 80053 COMPREHEN METABOLIC PANEL: CPT

## 2023-06-20 PROCEDURE — 84443 ASSAY THYROID STIM HORMONE: CPT

## 2023-06-20 PROCEDURE — 83036 HEMOGLOBIN GLYCOSYLATED A1C: CPT

## 2023-06-20 PROCEDURE — 93005 ELECTROCARDIOGRAM TRACING: CPT

## 2023-06-20 PROCEDURE — 85025 COMPLETE CBC W/AUTO DIFF WBC: CPT

## 2023-06-20 PROCEDURE — 93010 ELECTROCARDIOGRAM REPORT: CPT | Performed by: INTERNAL MEDICINE

## 2023-06-20 PROCEDURE — 80061 LIPID PANEL: CPT

## 2023-07-17 ENCOUNTER — OFFICE VISIT (OUTPATIENT)
Facility: CLINIC | Age: 63
End: 2023-07-17

## 2023-07-17 VITALS
WEIGHT: 235 LBS | RESPIRATION RATE: 20 BRPM | BODY MASS INDEX: 32.9 KG/M2 | TEMPERATURE: 98 F | DIASTOLIC BLOOD PRESSURE: 80 MMHG | HEIGHT: 71 IN | SYSTOLIC BLOOD PRESSURE: 134 MMHG | HEART RATE: 108 BPM

## 2023-07-17 DIAGNOSIS — R21 RASH: ICD-10-CM

## 2023-07-17 DIAGNOSIS — E78.5 HYPERLIPIDEMIA, UNSPECIFIED HYPERLIPIDEMIA TYPE: ICD-10-CM

## 2023-07-17 DIAGNOSIS — I10 ESSENTIAL HYPERTENSION: Primary | ICD-10-CM

## 2023-07-17 PROCEDURE — 3079F DIAST BP 80-89 MM HG: CPT | Performed by: INTERNAL MEDICINE

## 2023-07-17 PROCEDURE — 3008F BODY MASS INDEX DOCD: CPT | Performed by: INTERNAL MEDICINE

## 2023-07-17 PROCEDURE — 99213 OFFICE O/P EST LOW 20 MIN: CPT | Performed by: INTERNAL MEDICINE

## 2023-07-17 PROCEDURE — 3075F SYST BP GE 130 - 139MM HG: CPT | Performed by: INTERNAL MEDICINE

## 2023-07-17 RX ORDER — ATORVASTATIN CALCIUM 10 MG/1
10 TABLET, FILM COATED ORAL NIGHTLY
Qty: 90 TABLET | Refills: 1 | Status: SHIPPED | OUTPATIENT
Start: 2023-07-17

## 2023-08-02 NOTE — TELEPHONE ENCOUNTER
Current Outpatient Medications:       clotrimazole-betamethasone 1-0.05 % External Cream, APPLY EXTERNALLY TO THE AFFECTED AREA TWICE DAILY AS NEEDED, Disp: 45 g, Rfl: 3

## 2023-08-03 NOTE — TELEPHONE ENCOUNTER
Please review. Protocol failed/ No protocol      Requested Prescriptions   Pending Prescriptions Disp Refills    clotrimazole-betamethasone 1-0.05 % External Cream 135 g 3     Sig: APPLY EXTERNALLY TO THE AFFECTED AREA TWICE DAILY AS NEEDED       There is no refill protocol information for this order               Recent Outpatient Visits              2 weeks ago Essential hypertension    [employer], [address] [facility], Leo Christy MD    Office Visit    1 month ago Routine physical examination    [employer], [address] [place] [address], Leo Christy MD    Office Visit    1 year ago Screen for colon cancer    [employer], [address] [facility], [place]    Nurse Only    1 year ago Routine physical examination    [facility] Mims Lan Brownie, MD    Office Visit    2 years ago Routine physical examination    Westley Mims Lan Brownie, MD    Office Visit

## 2023-08-04 RX ORDER — CLOTRIMAZOLE AND BETAMETHASONE DIPROPIONATE 10; .64 MG/G; MG/G
CREAM TOPICAL
Qty: 135 G | Refills: 3 | Status: SHIPPED | OUTPATIENT
Start: 2023-08-04

## 2023-08-05 ENCOUNTER — HOSPITAL ENCOUNTER (EMERGENCY)
Facility: HOSPITAL | Age: 63
Discharge: HOME | End: 2023-08-05
Attending: EMERGENCY MEDICINE
Payer: OTHER MISCELLANEOUS

## 2023-08-05 ENCOUNTER — APPOINTMENT (OUTPATIENT)
Dept: GENERAL RADIOLOGY | Facility: HOSPITAL | Age: 63
End: 2023-08-05
Attending: EMERGENCY MEDICINE
Payer: OTHER MISCELLANEOUS

## 2023-08-05 VITALS
WEIGHT: 235 LBS | HEART RATE: 80 BPM | HEIGHT: 72 IN | OXYGEN SATURATION: 93 % | DIASTOLIC BLOOD PRESSURE: 76 MMHG | RESPIRATION RATE: 18 BRPM | BODY MASS INDEX: 31.83 KG/M2 | SYSTOLIC BLOOD PRESSURE: 126 MMHG | TEMPERATURE: 98 F

## 2023-08-05 DIAGNOSIS — S76.309A HAMSTRING INJURY, INITIAL ENCOUNTER: Primary | ICD-10-CM

## 2023-08-05 PROCEDURE — 99284 EMERGENCY DEPT VISIT MOD MDM: CPT

## 2023-08-05 PROCEDURE — 73552 X-RAY EXAM OF FEMUR 2/>: CPT | Performed by: EMERGENCY MEDICINE

## 2023-08-05 RX ORDER — DIAZEPAM 5 MG/1
5 TABLET ORAL 3 TIMES DAILY PRN
Qty: 12 TABLET | Refills: 0 | Status: SHIPPED | OUTPATIENT
Start: 2023-08-05 | End: 2023-08-12

## 2023-08-05 RX ORDER — NAPROXEN 500 MG/1
500 TABLET ORAL 2 TIMES DAILY PRN
Qty: 14 TABLET | Refills: 0 | Status: SHIPPED | OUTPATIENT
Start: 2023-08-05 | End: 2023-08-12

## 2023-08-05 RX ORDER — HYDROCODONE BITARTRATE AND ACETAMINOPHEN 5; 325 MG/1; MG/1
1 TABLET ORAL ONCE
Status: COMPLETED | OUTPATIENT
Start: 2023-08-05 | End: 2023-08-05

## 2023-08-05 NOTE — ED INITIAL ASSESSMENT (HPI)
Patient arrives via EMS with reports of moving motorcycle at work and while trying to stop bike from falling pt. [place] something in Left leg pop. Patient states he did not hit ground. CMS intact.  Pain 4/10 at rest.

## 2023-08-05 NOTE — ED QUICK NOTES
Patient place of employment not on after hours [facility] health list.   Follow up apt with [address] [facility] on dc papers by MD.

## 2023-08-07 ENCOUNTER — TELEPHONE (OUTPATIENT)
Dept: ORTHOPEDICS CLINIC | Facility: CLINIC | Age: 63
End: 2023-08-07

## 2023-08-07 NOTE — TELEPHONE ENCOUNTER
Patients spouse requesting ER follow up for hamstring injury at work. No appointments available.  Please advise

## 2023-08-07 NOTE — TELEPHONE ENCOUNTER
S/w pt and he states he was at work on 8/5/23 and he was trying to move a motorcycle and the kickstand went out and he tried to brace his fall and felt like something ripped in his left thigh. He went to ER and had XR. Appt scheduled on 8/8 at 8:10am with Dr Anuja Shannon. Address provided.

## 2023-08-08 ENCOUNTER — OFFICE VISIT (OUTPATIENT)
Dept: ORTHOPEDICS CLINIC | Facility: CLINIC | Age: 63
End: 2023-08-08

## 2023-08-08 DIAGNOSIS — S76.302D LEFT HAMSTRING INJURY, SUBSEQUENT ENCOUNTER: Primary | ICD-10-CM

## 2023-08-08 PROCEDURE — 99244 OFF/OP CNSLTJ NEW/EST MOD 40: CPT | Performed by: ORTHOPAEDIC SURGERY

## 2023-08-08 NOTE — H&P
NURSING INTAKE COMMENTS: Patient presents with: Injury: Workers comp claim- Consult- L leg- onset- 8/05/23- he was moving a motorcycle put the kick stand down, tried to brace the bike fr falling and felt something in the back of his leg- went to ER and has xray in the system- was told no fx but possible pulled or torn hamstring- rates pain 4-9/10 on and off- stated feels like leg is heavy      HPI: This 57year old male presents today for Workmen's Compensation injury to his left hamstring that occurred 8/5/2023. He works for [employer] in [employer] [facility] [facility]. He put one of the motorcycles on the [place] but it failed. The was bracing the bike to lay it down and felt pain in his left hamstring. He reported the injury and had x-rays that day which were normal from a bony standpoint. The radiologist read mild joint space narrowing of the left hip however I reviewed the films and found them to be normal.  No bony fragment was seen in the hamstring region. He denies prior issues like this. He denies numbness and tingling or symptoms down the leg. His medical history is significant for hypertension and high cholesterol. He had a left biceps tendon repair many years ago and it even more distantly a large open shoulder surgery with a large deltopectoral incision. He lives independently his wife. There are a few steps to get in the house. He likes to build motorcycles. He still rides to some degree although that is decreasing in terms of frequency. He is in pretty good shape physically. Past Medical History:   Diagnosis Date    Diverticulosis     Essential hypertension     Nephrolithiasis      Past Surgical History:   Procedure Laterality Date    REMOVAL GALLBLADDER  2003     Current Outpatient Medications   Medication Sig Dispense Refill    naproxen 500 MG Oral Tab Take 1 tablet (500 mg total) by mouth 2 (two) times daily as needed.  14 tablet 0    diazePAM 5 MG Oral Tab Take 1 tablet (5 mg total) by mouth 3 (three) times daily as needed. 12 tablet 0    clotrimazole-betamethasone 1-0.05 % External Cream APPLY EXTERNALLY TO THE AFFECTED AREA TWICE DAILY AS NEEDED 135 g 3    atorvastatin 10 MG Oral Tab Take 1 tablet (10 mg total) by mouth nightly. 90 tablet 1    lisinopril 10 MG Oral Tab Take 1 tablet (10 mg total) by mouth daily. 90 tablet 1     No Known Allergies  Family History   Problem Relation Age of Onset    Heart Disease Father     Dementia Mother         Alzheimer's Disease     No family Hx of DVT/PE    Social History    Occupational History      Not on file    Tobacco Use      Smoking status: Every Day        Types: Cigars      Smokeless tobacco: Never      Tobacco comments: 1 unit per day    Vaping Use      Vaping Use: Never used    Substance and Sexual Activity      Alcohol use: Not Currently      Drug use: No      Sexual activity: Not Currently        Partners: Female       Review of Systems:  GENERAL: feels generally well, no significant weight loss or weight gain  SKIN: no ulcerated or worrisome skin lesions  EYES:denies blurred vision or double vision  HEENT: denies new nasal congestion, sinus pain or ST  LUNGS: denies shortness of breath  CARDIOVASCULAR: denies chest pain  GI: no hematemesis, no worsening heartburn, no diarrhea  : no dysuria, no blood in urine, no difficulty urinating, no incontinence  MUSCULOSKELETAL: no other musculoskeletal complaints other than in HPI  NEURO: no numbness or tingling, no weakness or balance disorder  PSYCHE: no depression or anxiety  HEMATOLOGIC: no hx of blood dyscrasia, no Hx DVT/PE  ENDOCRINE: no thyroid or diabetes issues  ALL/ASTHMA: no new hx of severe allergy or asthma    Physical Examination:    There were no vitals taken for this visit. Constitutional: appears well hydrated, alert and responsive, no acute distress noted  Extremities: When viewing the hamstrings left and right side by side, there was no bruising or ecchymosis or defect. Tenderness on the proximal medial hamstring but not at the gluteal notch. Musculoskeletal: He can actively flex both knees to resistance. He had pain on the left but not the right. Neurological: Normal motor and sensory bilateral lower extremities. Imaging: X-ray review as in HPI. XR FEMUR MIN 2 VIEWS LEFT (CPT=73552)    Result Date: 8/5/2023  PROCEDURE: XR FEMUR MIN 2 VIEWS LEFT (CPT=73552)  COMPARISON: None. INDICATIONS: Felt something pop in left leg while trying to stop a bike from falling. Left mid femur pain  TECHNIQUE: 2 views were obtained. FINDINGS:  BONES: No acute fracture or dislocation. Mild joint space narrowing in the hip. Joint space narrowing is seen in the lateral greater than medial knee compartments. SOFT TISSUES: Negative. No visible soft tissue swelling. EFFUSION: None visible. OTHER: Negative. CONCLUSION:   No acute fracture or dislocation. Dictated by (CST): Kenny Yao MD on 8/05/2023 at 12:48 PM     Finalized by (CST): Kenny Yao MD on 8/05/2023 at 12:50 PM             Lab Results   Component Value Date    WBC 12.4 (H) 06/20/2023    HGB 15.9 06/20/2023    .0 06/20/2023      Lab Results   Component Value Date     (H) 06/20/2023    BUN 14 06/20/2023    CREATSERUM 0.97 06/20/2023    GFRNAA 76 05/02/2022    GFRAA 88 05/02/2022        Assessment and Plan:  Diagnoses and all orders for this visit:    Left hamstring injury, subsequent encounter  -     PHYSICAL THERAPY - INTERNAL        Assessment: Left hamstring strain from 8/5/2023 work comp injury    Plan: I recommended a week off work and to start a course of physical therapy. When he returns to work next Tuesday, restrictions would be no lift more than 10 pounds, no squatting, and no ladders for 4 weeks. I do not anticipate surgery. I will see him in 2 to 3 weeks to check his progress. No x-rays are necessary. Follow Up: No follow-ups on file.     Mari Sheldon MD

## 2023-11-17 RX ORDER — LISINOPRIL 10 MG/1
10 TABLET ORAL DAILY
Qty: 90 TABLET | Refills: 3 | Status: SHIPPED | OUTPATIENT
Start: 2023-11-17

## 2023-11-17 NOTE — TELEPHONE ENCOUNTER
Refill passed per [facility] [place] protocol.     Requested Prescriptions   Pending Prescriptions Disp Refills    LISINOPRIL 10 MG Oral Tab [Pharmacy Med Name: LISINOPRIL 10MG TABLETS] 90 tablet 1     Sig: TAKE 1 TABLET(10 MG) BY MOUTH DAILY       Hypertensive Medications Protocol Passed - 11/16/2023 12:30 PM        Passed - In person appointment in the past 12 or next 3 months     Recent Outpatient Visits              3 months ago Left hamstring injury, subsequent encounter    [facility], [address] [facility], Josiah Villalta MD    Office Visit    4 months ago Essential hypertension    [facility], [address] [facility], Perla Christy MD    Office Visit    5 months ago Routine physical examination    [facility] Segal Deeann [place], MD    Office Visit    1 year ago Screen for colon cancer    [facility], [address] [facility], [place]    Nurse Only    1 year ago Routine physical examination    [facility], [address] [facility], Skyler Edwards MD    Office Visit          Future Appointments         Provider Department Appt Notes    In 3 weeks MD Mackenzie Kumar, [place] [address] [facility], [place] Blood pressure and cholesterol               Passed - Last BP reading less than 140/90     BP Readings from Last 1 Encounters:   08/05/23 126/76               Passed - CMP or BMP in past 6 months     Recent Results (from the past 4392 hour(s))   Comp Metabolic Panel (14)    Collection Time: 06/20/23  8:25 AM   Result Value Ref Range    Glucose 112 (H) 70 - 99 mg/dL    Sodium 143 136 - 145 mmol/L    Potassium 4.4 3.5 - 5.1 mmol/L    Chloride 113 (H) 98 - 112 mmol/L    CO2 25.0 21.0 - 32.0 mmol/L    Anion Gap 5 0 - 18 mmol/L    BUN 14 7 - 18 mg/dL    Creatinine 0.97 0.70 - 1.30 mg/dL    BUN/CREA Ratio 14.4 10.0 - 20.0    Calcium, Total 9.0 8.5 - 10.1 mg/dL    Calculated Osmolality 297 (H) 275 - 295 mOsm/kg    eGFR-Cr 87 >=60 mL/min/1.73m2    ALT 35 16 - 61 U/L    AST 15 15 - 37 U/L    Alkaline Phosphatase 40 (L) 45 - 117 U/L    Bilirubin, Total 0.5 0.1 - 2.0 mg/dL    Total Protein 7.8 6.4 - 8.2 g/dL    Albumin 3.9 3.4 - 5.0 g/dL    Globulin  3.9 2.8 - 4.4 g/dL    A/G Ratio 1.0 1.0 - 2.0    Patient Fasting for CMP? Yes      *Note: Due to a large number of results and/or encounters for the requested time period, some results have not been displayed. A complete set of results can be found in Results Review.                Passed - In person appointment or virtual visit in the past 6 months     Recent Outpatient Visits              3 months ago Left hamstring injury, subsequent encounter    [address], [address] [facility], Joaquin Logan MD    Office Visit    4 months ago Essential hypertension    [address], [address] [facility] Osmanihomar de la cruz MD    Office Visit    5 months ago Routine physical examination    [facility], Gisele Romero MD    Office Visit    1 year ago Screen for colon cancer    [address], [address], [place]    Nurse Only    1 year ago Routine physical examination    [address] [facility]    Office Visit          Future Appointments         Provider Department Appt Notes    In 3 weeks Mary Baum MD [address], [address] [facility] Blood pressure and cholesterol               Passed - EGFRCR or GFRNAA > 50     GFR Evaluation  EGFRCR: 87 , resulted on 6/20/2023             Future Appointments         Provider Department Appt Notes    In 3 weeks Mary Baum MD [facility] Blood pressure and cholesterol          Recent Outpatient Visits              3 months ago Left hamstring injury, subsequent encounter    [address] [facility], Jh Izquierdo MD    Office Visit    4 months ago Essential hypertension    [facility] Henry Amada Sam, MD    Office Visit    5 months ago Routine physical examination    [facility] Henry Amada Sam, MD    Office Visit    1 year ago Screen for colon cancer    [address], [address], [place]    Nurse Only    1 year ago Routine physical examination    Westley Corona Amada Sam, MD    Office Visit

## 2024-02-12 ENCOUNTER — OFFICE VISIT (OUTPATIENT)
Facility: CLINIC | Age: 64
End: 2024-02-12
Payer: COMMERCIAL

## 2024-02-12 VITALS
SYSTOLIC BLOOD PRESSURE: 126 MMHG | DIASTOLIC BLOOD PRESSURE: 80 MMHG | HEART RATE: 100 BPM | BODY MASS INDEX: 32.51 KG/M2 | WEIGHT: 240 LBS | HEIGHT: 72 IN | TEMPERATURE: 98 F | RESPIRATION RATE: 20 BRPM

## 2024-02-12 DIAGNOSIS — G47.9 SLEEP DISORDER: ICD-10-CM

## 2024-02-12 DIAGNOSIS — I10 ESSENTIAL HYPERTENSION: Primary | ICD-10-CM

## 2024-02-12 DIAGNOSIS — E78.5 HYPERLIPIDEMIA, UNSPECIFIED HYPERLIPIDEMIA TYPE: ICD-10-CM

## 2024-02-12 DIAGNOSIS — F17.200 TOBACCO USE DISORDER: ICD-10-CM

## 2024-02-12 PROCEDURE — 3079F DIAST BP 80-89 MM HG: CPT | Performed by: INTERNAL MEDICINE

## 2024-02-12 PROCEDURE — 3008F BODY MASS INDEX DOCD: CPT | Performed by: INTERNAL MEDICINE

## 2024-02-12 PROCEDURE — 99214 OFFICE O/P EST MOD 30 MIN: CPT | Performed by: INTERNAL MEDICINE

## 2024-02-12 PROCEDURE — 3074F SYST BP LT 130 MM HG: CPT | Performed by: INTERNAL MEDICINE

## 2024-02-12 RX ORDER — ATORVASTATIN CALCIUM 10 MG/1
10 TABLET, FILM COATED ORAL NIGHTLY
Qty: 90 TABLET | Refills: 3 | Status: SHIPPED | OUTPATIENT
Start: 2024-02-12

## 2024-02-12 RX ORDER — LISINOPRIL 10 MG/1
10 TABLET ORAL DAILY
Qty: 90 TABLET | Refills: 3 | Status: SHIPPED | OUTPATIENT
Start: 2024-02-12

## 2024-02-12 NOTE — PROGRESS NOTES
HPI:    Patient ID: Rj Jasso is a 63 year old male.    HPI  Patient is here for follow-up on chronic medical issues as listed below.  Last seen here in July of last year.  That time blood pressure borderline at 146/80.  Had a bit of a cough.  No changes made in the lisinopril 10 mg a day.  He had full blood work done in June of last year.  Current medications reviewed.  Blood pressure is well-controlled when checked by nurse in the office today.  Weight is up 5 pounds.  Current medications reviewed.    No major issues at this time. He pulled a hamstring on the left leg; treated with PT for a while. Leg still with occasional pain. He falls asleep at night after coming home from work. The overnight sleep is 8 hours. Feels rested in the morning. He is planning on retiring this summer. He does snore at night. Wife thinks he may have sleep apnea. Had sleep studies 20 years ago. Not checking BP recently; it was running good. Tobacco use is quite a bit less. He smokes [employer]. He plans on quitting soon. For exercise, he has equipment in the basement. Diet is better; he eats late.     Patient Active Problem List   Diagnosis    Hyperlipidemia    Tobacco use disorder    Hamstring injury, initial encounter          HISTORY:  Past Medical History:   Diagnosis Date    Diverticulosis     Essential hypertension     Nephrolithiasis       Past Surgical History:   Procedure Laterality Date    REMOVAL GALLBLADDER  2003      Family History   Problem Relation Age of Onset    Heart Disease Father     Dementia Mother         Alzheimer's Disease      Social History     Socioeconomic History    Marital status:    Tobacco Use    Smoking status: Every Day     Types: Cigars    Smokeless tobacco: Never    Tobacco comments:     1 unit per day   Vaping Use    Vaping Use: Never used   Substance and Sexual Activity    Alcohol use: Not Currently    Drug use: No    Sexual activity: Not Currently     Partners: Female           Review of Systems          Current Outpatient Medications   Medication Sig Dispense Refill    lisinopril 10 MG Oral Tab Take 1 tablet (10 mg total) by mouth daily. 90 tablet 3    clotrimazole-betamethasone 1-0.05 % External Cream APPLY EXTERNALLY TO THE AFFECTED AREA TWICE DAILY AS NEEDED 135 g 3    atorvastatin 10 MG Oral Tab Take 1 tablet (10 mg total) by mouth nightly. 90 tablet 1     Allergies:No Known Allergies     PHYSICAL EXAM:   /80 (BP Location: Right leg, Patient Position: Sitting, Cuff Size: large)   Pulse 100   Temp 98.4 °F (36.9 °C) (Other)   Resp 20   Ht 6' (1.829 m)   Wt 240 lb (108.9 kg)   BMI 32.55 kg/m²      Physical Exam  Constitutional:       Appearance: Normal appearance. He is well-developed.   HENT:      Nose: Nose normal.      Mouth/Throat:      Pharynx: No oropharyngeal exudate or posterior oropharyngeal erythema.   Eyes:      Conjunctiva/sclera: Conjunctivae normal.   Neck:      Vascular: No carotid bruit.   Cardiovascular:      Rate and Rhythm: Normal rate and regular rhythm.      Pulses: Normal pulses.      Heart sounds: Normal heart sounds. No murmur heard.  Pulmonary:      Effort: Pulmonary effort is normal.      Breath sounds: Normal breath sounds. No wheezing or rales.   Abdominal:      General: Bowel sounds are normal.      Palpations: Abdomen is soft. There is no mass.      Tenderness: There is no abdominal tenderness.   Musculoskeletal:      Right lower leg: No edema.      Left lower leg: No edema.   Lymphadenopathy:      Cervical: No cervical adenopathy.   Skin:     General: Skin is warm and dry.      Findings: No rash.   Neurological:      General: No focal deficit present.      Mental Status: He is alert.   Psychiatric:         Mood and Affect: Mood normal.         Behavior: Behavior normal.         Thought Content: Thought content normal.          Wt Readings from Last 6 Encounters:   02/12/24 240 lb (108.9 kg)   08/05/23 235 lb (106.6 kg)   07/17/23 235 lb  (106.6 kg)   06/12/23 238 lb (108 kg)   05/02/22 236 lb (107 kg)   12/23/21 230 lb (104.3 kg)             ASSESSMENT/PLAN:   1. Essential hypertension  Blood pressure is well-controlled today.  Continue current medication.  Medications reviewed.  Refill sent to pharmacy.  Follow-up in 6 months for general physical exam and full blood work.    2. Hyperlipidemia, unspecified hyperlipidemia type  Doing well.  Continue current dose of statin medication.  Medication review and refill sent.    3. Tobacco use disorder  Patient has cut down on his intake of tobacco.  Encouraged to stop altogether.    4. Sleep disorder  Suspicious for sleep apnea based on body habitus as well as snoring and witnessed possible apnea from the wife.  Discussed possibly setting up a home sleep study.  Patient wants to hold off on that for right now.  May consider it in the future.  Discussed the health benefits of properly treating sleep apnea.         Meds This Visit:  Requested Prescriptions     Pending Prescriptions Disp Refills    atorvastatin 10 MG Oral Tab 90 tablet 1     Sig: Take 1 tablet (10 mg total) by mouth nightly.    lisinopril 10 MG Oral Tab 90 tablet 3     Sig: Take 1 tablet (10 mg total) by mouth daily.       Imaging & Referrals:  None         Frankie Yu MD

## 2024-02-13 ENCOUNTER — PATIENT MESSAGE (OUTPATIENT)
Dept: DERMATOLOGY CLINIC | Facility: CLINIC | Age: 64
End: 2024-02-13

## 2024-02-13 ENCOUNTER — OFFICE VISIT (OUTPATIENT)
Dept: DERMATOLOGY CLINIC | Facility: CLINIC | Age: 64
End: 2024-02-13
Payer: COMMERCIAL

## 2024-02-13 DIAGNOSIS — D22.9 MULTIPLE BENIGN NEVI: ICD-10-CM

## 2024-02-13 DIAGNOSIS — L81.4 LENTIGINES: ICD-10-CM

## 2024-02-13 DIAGNOSIS — D18.01 CHERRY ANGIOMA: ICD-10-CM

## 2024-02-13 DIAGNOSIS — L82.1 SEBORRHEIC KERATOSES: Primary | ICD-10-CM

## 2024-02-13 PROCEDURE — 99203 OFFICE O/P NEW LOW 30 MIN: CPT | Performed by: STUDENT IN AN ORGANIZED HEALTH CARE EDUCATION/TRAINING PROGRAM

## 2024-02-13 NOTE — PROGRESS NOTES
February 13, 2024    New patient     CHIEF COMPLAINT: FBSE    HISTORY OF PRESENT ILLNESS: .    1. Skin growth  Location: L side   Duration: 2-3 years  Signs and symptoms: Itchy  Current treatment: None  Past treatments: None    2. Skin growths  Location: Back   Duration: 2-3 years  Signs and symptoms: Itchy  Current treatment: None  Past treatments: None    3. Derm problem  Location: Bilateral hands   Duration: 2 years  Signs and symptoms: Cracking skin, dryness  Current treatment: Clotrimazole-betamethasone 1-0.05% external cream  Past treatments: None    - No particular lesions of concern.       DERM HISTORY:  History of skin cancer: No    FAMILY HISTORY:  History of melanoma: No    PAST MEDICAL HISTORY:  Past Medical History:   Diagnosis Date    Diverticulosis     Essential hypertension     Nephrolithiasis        REVIEW OF SYSTEMS:  Constitutional: Denies fever, chills, unintentional weight loss.   Skin as per HPI    Medications  Current Outpatient Medications   Medication Sig Dispense Refill    atorvastatin 10 MG Oral Tab Take 1 tablet (10 mg total) by mouth nightly. 90 tablet 3    lisinopril 10 MG Oral Tab Take 1 tablet (10 mg total) by mouth daily. 90 tablet 3    clotrimazole-betamethasone 1-0.05 % External Cream APPLY EXTERNALLY TO THE AFFECTED AREA TWICE DAILY AS NEEDED 135 g 3       PHYSICAL EXAM:  Patient declined chaperone   General: awake, alert, no acute distress  Skin: Skin exam was performed today including the following: head and face, scalp, neck, chest (including breasts and axillae), abdomen, back, bilateral upper extremities, bilateral lower extremities, hands, feet, digits, nails. Pertinent findings include:   - Scattered bright red-purple dome-shaped papules on the trunk and extremities   - Scattered light brown stellate macules on sun exposed sites  - Scattered, evenly colored, round brown macules and papules with regular borders on the trunk and extremities  - Numerous scattered skin-colored  and brown, waxy, stuck-on papules and plaques on the trunk and extremities      ASSESSMENT & PLAN:  Pathophysiology of diagnoses discussed with patient.  Therapeutic options reviewed. Risks, benefits, and alternatives discussed with patient. Instructions reviewed at length.    #Lentigines  #Seborrheic keratoses   #Cherry angiomas   - Reassurance provided regarding the benign nature of these lesions.    #Multiple benign nevi  - Complete skin exam performed today with no outlier lesions identified   - Reassured patient of benign nature of these lesions.   - Recommend daily photoprotection with broad-spectrum sunscreen, avoidance of sun during peak hours, and sun protective clothing.    - Dermoscopy was used for physical examination of pigmented lesions during today's office visit.      Return to clinic: 2-3 years  or sooner if something concerning arises     Pramod Haskins MD

## 2024-02-13 NOTE — TELEPHONE ENCOUNTER
LOV today, 2/13/24 - Unable to locate mention of a new topical in the office visit note.  Please advise.  Thank you.

## 2024-02-14 ENCOUNTER — PATIENT MESSAGE (OUTPATIENT)
Dept: DERMATOLOGY CLINIC | Facility: CLINIC | Age: 64
End: 2024-02-14

## 2024-02-14 RX ORDER — NYSTATIN AND TRIAMCINOLONE ACETONIDE 100000; 1 [USP'U]/G; MG/G
OINTMENT TOPICAL
Qty: 60 G | Refills: 3 | Status: SHIPPED | OUTPATIENT
Start: 2024-02-14

## 2024-02-19 NOTE — TELEPHONE ENCOUNTER
Please have him use a goodrx coupon. Should be $23 dollars with goodrx. [facility] can provide him one if he asks.     Pramod Haskins MD

## 2024-02-19 NOTE — TELEPHONE ENCOUNTER
From: Pramod Haskins  To: Rj ISA Romero  Sent: 2/14/2024 1:29 PM CST  Subject: Medication    Mr. Jasso,    I went ahead and resent the medication. Please let me know if it is not covered by insurance.     Pramod Haskins MD

## 2024-10-24 ENCOUNTER — HOSPITAL ENCOUNTER (INPATIENT)
Facility: HOSPITAL | Age: 64
LOS: 1 days | Discharge: HOME | End: 2024-10-25
Attending: EMERGENCY MEDICINE | Admitting: INTERNAL MEDICINE
Payer: MEDICARE

## 2024-10-24 ENCOUNTER — APPOINTMENT (OUTPATIENT)
Dept: CT IMAGING | Facility: HOSPITAL | Age: 64
End: 2024-10-24
Attending: EMERGENCY MEDICINE
Payer: MEDICARE

## 2024-10-24 ENCOUNTER — APPOINTMENT (OUTPATIENT)
Dept: ULTRASOUND IMAGING | Facility: HOSPITAL | Age: 64
End: 2024-10-24
Attending: INTERNAL MEDICINE
Payer: MEDICARE

## 2024-10-24 ENCOUNTER — APPOINTMENT (OUTPATIENT)
Dept: GENERAL RADIOLOGY | Facility: HOSPITAL | Age: 64
End: 2024-10-24
Attending: EMERGENCY MEDICINE
Payer: MEDICARE

## 2024-10-24 ENCOUNTER — APPOINTMENT (OUTPATIENT)
Dept: CV DIAGNOSTICS | Facility: HOSPITAL | Age: 64
End: 2024-10-24
Attending: INTERNAL MEDICINE
Payer: MEDICARE

## 2024-10-24 DIAGNOSIS — I26.99 ACUTE PULMONARY EMBOLISM, UNSPECIFIED PULMONARY EMBOLISM TYPE, UNSPECIFIED WHETHER ACUTE COR PULMONALE PRESENT (HCC): Primary | ICD-10-CM

## 2024-10-24 LAB
ALBUMIN SERPL-MCNC: 4.7 G/DL (ref 3.2–4.8)
ALP LIVER SERPL-CCNC: 60 U/L
ALT SERPL-CCNC: 39 U/L
ANION GAP SERPL CALC-SCNC: 8 MMOL/L (ref 0–18)
APTT PPP: 28.3 SECONDS (ref 23–36)
APTT PPP: 63.8 SECONDS (ref 23–36)
AST SERPL-CCNC: 20 U/L (ref ?–34)
BASOPHILS # BLD AUTO: 0.05 X10(3) UL (ref 0–0.2)
BASOPHILS NFR BLD AUTO: 0.4 %
BILIRUB DIRECT SERPL-MCNC: 0.2 MG/DL (ref ?–0.3)
BILIRUB SERPL-MCNC: 0.6 MG/DL (ref 0.2–1.1)
BILIRUB UR QL: NEGATIVE
BUN BLD-MCNC: 10 MG/DL (ref 9–23)
BUN/CREAT SERPL: 10.8 (ref 10–20)
CALCIUM BLD-MCNC: 9.5 MG/DL (ref 8.7–10.4)
CHLORIDE SERPL-SCNC: 108 MMOL/L (ref 98–112)
CLARITY UR: CLEAR
CO2 SERPL-SCNC: 23 MMOL/L (ref 21–32)
COLOR UR: YELLOW
CREAT BLD-MCNC: 0.93 MG/DL
D DIMER PPP FEU-MCNC: 1.9 UG/ML FEU (ref ?–0.65)
DEPRECATED RDW RBC AUTO: 41.1 FL (ref 35.1–46.3)
EGFRCR SERPLBLD CKD-EPI 2021: 91 ML/MIN/1.73M2 (ref 60–?)
EOSINOPHIL # BLD AUTO: 0.13 X10(3) UL (ref 0–0.7)
EOSINOPHIL NFR BLD AUTO: 1.1 %
ERYTHROCYTE [DISTWIDTH] IN BLOOD BY AUTOMATED COUNT: 12.7 % (ref 11–15)
FLUAV + FLUBV RNA SPEC NAA+PROBE: NEGATIVE
FLUAV + FLUBV RNA SPEC NAA+PROBE: NEGATIVE
GLUCOSE BLD-MCNC: 122 MG/DL (ref 70–99)
GLUCOSE UR-MCNC: NORMAL MG/DL
HCT VFR BLD AUTO: 43.1 %
HGB BLD-MCNC: 14.9 G/DL
IMM GRANULOCYTES # BLD AUTO: 0.09 X10(3) UL (ref 0–1)
IMM GRANULOCYTES NFR BLD: 0.7 %
KETONES UR-MCNC: NEGATIVE MG/DL
L PNEUMO AG UR QL: NEGATIVE
LEUKOCYTE ESTERASE UR QL STRIP.AUTO: NEGATIVE
LYMPHOCYTES # BLD AUTO: 0.89 X10(3) UL (ref 1–4)
LYMPHOCYTES NFR BLD AUTO: 7.3 %
MCH RBC QN AUTO: 30.5 PG (ref 26–34)
MCHC RBC AUTO-ENTMCNC: 34.6 G/DL (ref 31–37)
MCV RBC AUTO: 88.1 FL
MONOCYTES # BLD AUTO: 1.04 X10(3) UL (ref 0.1–1)
MONOCYTES NFR BLD AUTO: 8.5 %
NEUTROPHILS # BLD AUTO: 9.99 X10 (3) UL (ref 1.5–7.7)
NEUTROPHILS # BLD AUTO: 9.99 X10(3) UL (ref 1.5–7.7)
NEUTROPHILS NFR BLD AUTO: 82 %
NITRITE UR QL STRIP.AUTO: NEGATIVE
OSMOLALITY SERPL CALC.SUM OF ELEC: 288 MOSM/KG (ref 275–295)
PH UR: 5.5 [PH] (ref 5–8)
PLATELET # BLD AUTO: 339 10(3)UL (ref 150–450)
POTASSIUM SERPL-SCNC: 4.3 MMOL/L (ref 3.5–5.1)
PROT SERPL-MCNC: 7.6 G/DL (ref 5.7–8.2)
RBC # BLD AUTO: 4.89 X10(6)UL
RSV RNA SPEC NAA+PROBE: NEGATIVE
SARS-COV-2 RNA RESP QL NAA+PROBE: NOT DETECTED
SODIUM SERPL-SCNC: 139 MMOL/L (ref 136–145)
SP GR UR STRIP: 1.02 (ref 1–1.03)
STREP PNEUMO ANTIGEN, URINE: NEGATIVE
UROBILINOGEN UR STRIP-ACNC: NORMAL
WBC # BLD AUTO: 12.2 X10(3) UL (ref 4–11)

## 2024-10-24 PROCEDURE — 71101 X-RAY EXAM UNILAT RIBS/CHEST: CPT | Performed by: EMERGENCY MEDICINE

## 2024-10-24 PROCEDURE — 71260 CT THORAX DX C+: CPT | Performed by: EMERGENCY MEDICINE

## 2024-10-24 PROCEDURE — 93306 TTE W/DOPPLER COMPLETE: CPT | Performed by: INTERNAL MEDICINE

## 2024-10-24 PROCEDURE — 99223 1ST HOSP IP/OBS HIGH 75: CPT | Performed by: INTERNAL MEDICINE

## 2024-10-24 PROCEDURE — 93970 EXTREMITY STUDY: CPT | Performed by: INTERNAL MEDICINE

## 2024-10-24 RX ORDER — METOCLOPRAMIDE HYDROCHLORIDE 5 MG/ML
10 INJECTION INTRAMUSCULAR; INTRAVENOUS EVERY 8 HOURS PRN
Status: DISCONTINUED | OUTPATIENT
Start: 2024-10-24 | End: 2024-10-25

## 2024-10-24 RX ORDER — LISINOPRIL 10 MG/1
10 TABLET ORAL DAILY
Status: DISCONTINUED | OUTPATIENT
Start: 2024-10-25 | End: 2024-10-25

## 2024-10-24 RX ORDER — HEPARIN SODIUM AND DEXTROSE 10000; 5 [USP'U]/100ML; G/100ML
INJECTION INTRAVENOUS CONTINUOUS
Status: DISCONTINUED | OUTPATIENT
Start: 2024-10-24 | End: 2024-10-25

## 2024-10-24 RX ORDER — HYDROCODONE BITARTRATE AND ACETAMINOPHEN 5; 325 MG/1; MG/1
1 TABLET ORAL EVERY 6 HOURS PRN
Status: DISCONTINUED | OUTPATIENT
Start: 2024-10-24 | End: 2024-10-25

## 2024-10-24 RX ORDER — SENNOSIDES 8.6 MG
17.2 TABLET ORAL NIGHTLY PRN
Status: DISCONTINUED | OUTPATIENT
Start: 2024-10-24 | End: 2024-10-25

## 2024-10-24 RX ORDER — BENZONATATE 200 MG/1
200 CAPSULE ORAL 3 TIMES DAILY PRN
Status: DISCONTINUED | OUTPATIENT
Start: 2024-10-24 | End: 2024-10-25

## 2024-10-24 RX ORDER — SODIUM PHOSPHATE, DIBASIC AND SODIUM PHOSPHATE, MONOBASIC 7; 19 G/230ML; G/230ML
1 ENEMA RECTAL ONCE AS NEEDED
Status: DISCONTINUED | OUTPATIENT
Start: 2024-10-24 | End: 2024-10-25

## 2024-10-24 RX ORDER — HEPARIN SODIUM 1000 [USP'U]/ML
80 INJECTION, SOLUTION INTRAVENOUS; SUBCUTANEOUS ONCE
Status: COMPLETED | OUTPATIENT
Start: 2024-10-24 | End: 2024-10-24

## 2024-10-24 RX ORDER — AZITHROMYCIN 250 MG/1
500 TABLET, FILM COATED ORAL
Status: DISCONTINUED | OUTPATIENT
Start: 2024-10-24 | End: 2024-10-25

## 2024-10-24 RX ORDER — HEPARIN SODIUM AND DEXTROSE 10000; 5 [USP'U]/100ML; G/100ML
18 INJECTION INTRAVENOUS ONCE
Status: COMPLETED | OUTPATIENT
Start: 2024-10-24 | End: 2024-10-24

## 2024-10-24 RX ORDER — ATORVASTATIN CALCIUM 10 MG/1
10 TABLET, FILM COATED ORAL NIGHTLY
Status: DISCONTINUED | OUTPATIENT
Start: 2024-10-24 | End: 2024-10-25

## 2024-10-24 RX ORDER — BISACODYL 10 MG
10 SUPPOSITORY, RECTAL RECTAL
Status: DISCONTINUED | OUTPATIENT
Start: 2024-10-24 | End: 2024-10-25

## 2024-10-24 RX ORDER — ACETAMINOPHEN 500 MG
500 TABLET ORAL EVERY 4 HOURS PRN
Status: DISCONTINUED | OUTPATIENT
Start: 2024-10-24 | End: 2024-10-25

## 2024-10-24 RX ORDER — ONDANSETRON 2 MG/ML
4 INJECTION INTRAMUSCULAR; INTRAVENOUS EVERY 6 HOURS PRN
Status: DISCONTINUED | OUTPATIENT
Start: 2024-10-24 | End: 2024-10-25

## 2024-10-24 RX ORDER — POLYETHYLENE GLYCOL 3350 17 G/17G
17 POWDER, FOR SOLUTION ORAL DAILY PRN
Status: DISCONTINUED | OUTPATIENT
Start: 2024-10-24 | End: 2024-10-25

## 2024-10-24 NOTE — CONSULTS
Initial Pulmonary Medicine Inpatient Consultation           Consult requested by Dr. Atkinson for pulmonary embolism.        HPI: 62 yo male with hx of HTN admitted with right sided chest pain, shortness of breath, coughing, and URI symptoms x 2 days. Reports he tested neg for COVID at home.   In the ED, found to have pulmonary embolism, possible pulmonary infarct.   Pt denies previous hx of blood clots, no family hx of blood clots. No recent travel or immobilization.       REVIEW OF SYSTEMS:  Positives and negatives as stated in HPI. Remainder of 12 pt review of systems otherwise are negative.       PAST MEDICAL HISTORY:  Past Medical History:   Diagnosis Date    Diverticulosis     Essential hypertension     Nephrolithiasis         PAST SURGICAL HISTORY:  Past Surgical History:   Procedure Laterality Date    Removal gallbladder  2003        PAST FAMILY HISTORY:  Family History   Problem Relation Age of Onset    Heart Disease Father     Dementia Mother         Alzheimer's Disease        PAST SOCIAL HISTORY:  Social History     Socioeconomic History    Marital status:    Tobacco Use    Smoking status: Every Day     Types: Cigars    Smokeless tobacco: Never    Tobacco comments:     1 unit per day   Vaping Use    Vaping status: Never Used   Substance and Sexual Activity    Alcohol use: Not Currently    Drug use: No    Sexual activity: Not Currently     Partners: Female   Other Topics Concern    History of tanning No    Reaction to local anesthetic No    Pt has a pacemaker No    Pt has a defibrillator No        ALLERGIES:  Allergies[1]     MEDS:  Home Medications:  Medications Taking[2]  Scheduled Medication:   ED initial dose (PE/DVT/THROMBUS) heparin  18 Units/kg/hr Intravenous Once    atorvastatin  10 mg Oral Nightly    [START ON 10/25/2024] lisinopril  10 mg Oral Daily     Continuous Infusing Medication:   continuous dose heparin       PRN Medications:    acetaminophen    polyethylene glycol (PEG 3350)     sennosides    bisacodyl    fleet enema    melatonin    ondansetron    metoclopramide    benzonatate    influenza virus vaccine PF       PHYSICAL EXAM:  /80 (BP Location: Left arm)   Pulse 87   Temp 97.9 °F (36.6 °C) (Oral)   Resp 18   Ht 6' (1.829 m)   Wt 235 lb (106.6 kg)   SpO2 95%   BMI 31.87 kg/m²   CONSTITUTIONAL: alert, oriented, no apparent distress  HEENT: atraumatic normocephalic  MOUTH: mucous membranes are moist. No OP exudates  NECK/THROAT: no JVD. Trachea midline. No obvious thyromegaly  LUNG: clear b/l no wheezing, + faint bibasilar crackles. Chest symmetric with respiratory motion  HEART: regular rate and rhythm, no obvious murmers or gallops note  ABD: soft non tender. + bowel sounds. No organomegaly noted  EXT: no clubbing, cyanosis, or edema noted. Pulses intact grossly  NEURO/MUSCULOSKELETAL: no gross deficits  SKIN: warm, dry. No obvious lesions noted  LYMPH: no obvious LAD       IMAGES:  Chest CTA  FINDINGS:   CARDIAC: Heart is not enlarged. There are coronary artery calcifications. No pericardial effusion.   MEDIASTINUM/EMELY: Scattered subcentimeter and borderline enlarged lymph nodes at the mediastinum and emely.  Calcified subcarinal node/granuloma.  10 x 13 mm and 11 x 12 mm right hilar lymphadenopathy.   LUNGS/PLEURA: Central airways are patent. Minimal dependent subsegmental atelectasis in the lung bases.  Patchy ground-glass opacity involving the posterior and lateral basal segments of the right lower lobe with upstream pulmonary embolus. Calcified   granuloma in the left lower lobe.  Trace/small bilateral pleural effusions slightly larger on the right.  No pneumothorax.   VASCULATURE: Main pulmonary artery is not enlarged.  Acute.  In thrombosis within the peripheral aspects of the right lower lobar artery extending into the segmental and subsegmental branches.  Additional thrombi embolus within the left lower lobar   artery and segmental branches.   CHEST WALL: No axillary  or clavicular lymphadenopathy.    LIMITED ABDOMEN: Post cholecystectomy.  No significant biliary ductal dilatation.   BONES: Scattered endplate osteophytes and Schmorl's nodes within the spine.  No suspicious bone lesion.  Orthopedic screw in the left glenoid.       LABS:  Recent Labs   Lab 10/24/24  0638   RBC 4.89   HGB 14.9   HCT 43.1   MCV 88.1   MCH 30.5   MCHC 34.6   RDW 12.7   NEPRELIM 9.99*   WBC 12.2*   .0       Recent Labs   Lab 10/24/24  0638   *   BUN 10   CREATSERUM 0.93   EGFRCR 91   CA 9.5   ALB 4.7      K 4.3      CO2 23.0   ALKPHO 60   AST 20   ALT 39   BILT 0.6   TP 7.6       ASSESSMENT/PLAN:  1.Acute PE  -heparin gtt   -check TTE and LE dopplers  -primary team checking for COVID-19  -supp 02 PRN  -pain control    2.Pulmonary infarct vs PNA  -can give short coarse of azithromycin   -repeat CT as outpt in 3 months    3. Hilar LN enlargement  -maybe reactive  -repeat outpt chest imaging     4.Proph  -DVT: hep gtt    Will follow       Thank you for the opportunity to care for Rj Jasso.       PARIS Anguiano DO, MPH  Pulmonary Critical Care Medicine  [facility]          [1] No Known Allergies  [2]   Outpatient Medications Marked as Taking for the 10/24/24 encounter (Hospital Encounter)   Medication Sig Dispense Refill    nystatin-triamcinolone 100,000-0.1 Units/g-% External Ointment Apply twice daily  Monday-Friday to affected areas of rash. 60 g 3    atorvastatin 10 MG Oral Tab Take 1 tablet (10 mg total) by mouth nightly. 90 tablet 3    lisinopril 10 MG Oral Tab Take 1 tablet (10 mg total) by mouth daily. 90 tablet 3    clotrimazole-betamethasone 1-0.05 % External Cream APPLY EXTERNALLY TO THE AFFECTED AREA TWICE DAILY AS NEEDED 135 g 3

## 2024-10-24 NOTE — ED PROVIDER NOTES
Patient Seen in: [facility] Emergency Department      History     Chief Complaint   Patient presents with    Abdomen/Flank Pain     Stated Complaint: Flank pain. Difficulty Breathing    Subjective:   HPI      Patient is a 63-year-old male he states that last week he had URI symptoms that seem to improve he was coughing a lot.  Now since yesterday he has had pain in his right lateral chest.  It is worse with movement is worse with a cough.  Somewhat worse with a deep breath.  No abdominal pain no nausea vomiting or diarrhea no hematuria but noticed some dark urine.  No trauma no injury    Objective:     Past Medical History:    Diverticulosis    Essential hypertension    Nephrolithiasis              Past Surgical History:   Procedure Laterality Date    Removal gallbladder  2003                Social History     Socioeconomic History    Marital status:    Tobacco Use    Smoking status: Every Day     Types: Cigars    Smokeless tobacco: Never    Tobacco comments:     1 unit per day   Vaping Use    Vaping status: Never Used   Substance and Sexual Activity    Alcohol use: Not Currently    Drug use: No    Sexual activity: Not Currently     Partners: Female   Other Topics Concern    History of tanning No    Reaction to local anesthetic No    Pt has a pacemaker No    Pt has a defibrillator No                  Physical Exam     ED Triage Vitals [10/24/24 0616]   /82   Pulse 103   Resp 17   Temp 98.1 °F (36.7 °C)   Temp src Oral   SpO2 95 %   O2 Device None (Room air)       Current Vitals:   Vital Signs  BP: 139/81  Pulse: 92  Resp: 14  Temp: 98.1 °F (36.7 °C)  Temp src: Oral  MAP (mmHg): 97    Oxygen Therapy  SpO2: 95 %  O2 Device: None (Room air)        Physical Exam  Constitutional: Oriented to person, place, and time. Appears well-developed and well-nourished.   HEENT:   Head: Normocephalic and atraumatic.   Right Ear: External ear normal.   Left Ear: External ear normal.   Nose: Nose normal.    Mouth/Throat: Oropharynx is clear and moist.   Eyes: Conjunctivae and EOM are normal. Pupils are equal, round, and reactive to light.   Neck: Neck supple.   Cardiovascular: Normal rate, regular rhythm, normal heart sounds and intact distal pulses.    Pulmonary/Chest: Tenderness is right lateral lower rib cage area no rash no bruise effort normal and breath sounds normal. No respiratory distress.   Abdominal: Soft. Bowel sounds are normal. Exhibits no distension and no mass. There is no tenderness. There is no rebound and no guarding.   Musculoskeletal: Normal range of motion. Exhibits no edema or tenderness.   Lymphadenopathy: No cervical adenopathy.   Neurological: Alert and oriented to person, place, and time. Normal reflexes. No cranial nerve deficit. No motor os sensory defecits noted Coordination normal.   Skin: Skin is warm and dry.   Psychiatric: Normal mood and affect. Behavior is normal. Judgment and thought content normal.   Nursing note and vitals reviewed.        ED Course     Labs Reviewed   BASIC METABOLIC PANEL (8) - Abnormal; Notable for the following components:       Result Value    Glucose 122 (*)     All other components within normal limits   CBC WITH DIFFERENTIAL WITH PLATELET - Abnormal; Notable for the following components:    WBC 12.2 (*)     Neutrophil Absolute Prelim 9.99 (*)     Neutrophil Absolute 9.99 (*)     Lymphocyte Absolute 0.89 (*)     Monocyte Absolute 1.04 (*)     All other components within normal limits   D-DIMER - Abnormal; Notable for the following components:    D-Dimer 1.90 (*)     All other components within normal limits   URINALYSIS WITH CULTURE REFLEX - Abnormal; Notable for the following components:    Blood Urine Trace (*)     Protein Urine Trace (*)     Squamous Epi. Cells Few (*)     All other components within normal limits   HEPATIC FUNCTION PANEL (7) - Normal   PTT, ACTIVATED                   MDM      Use of independent historian: Wife at bedside helps with  history.  She reported URI last week.    I personally reviewed and interpreted the images :     CT CHEST PE AORTA (IV ONLY) (CPT=71260)    Result Date: 10/24/2024  CONCLUSION:  1.  Acute small volume of pulmonary embolus within both lower lobes in the lobar and segmental pulmonary arteries.  Small right basal pulmonary infarct.  No pulmonary artery enlargement.  No cardiac strain. 2.  Small bilateral pleural effusions slightly larger on the right. 3.  Nonspecific lymphadenopathy within the right pulmonary hilum. 4.  Post cholecystectomy. 5.  Coronary atherosclerosis.    Exam discussed with Dr. Zavala on 10/24/24 at 8:05 a.m.  Dictated by (CST): Nishant Corrales MD on 10/24/2024 at 8:00 AM     Finalized by (CST): Nishant Corrales MD on 10/24/2024 at 8:08 AM          XR RIBS WITH CHEST (3 VIEWS), RIGHT  (CPT=71101)    Result Date: 10/24/2024  CONCLUSION: No acute cardiopulmonary abnormality.  No acute right rib fracture or deformity.   Dictated by (CST): Nishant Corrales MD on 10/24/2024 at 7:25 AM     Finalized by (CST): Nishant Corrales MD on 10/24/2024 at 7:27 AM           Vitals:    10/24/24 0616 10/24/24 0646   BP: 135/82 139/81   Pulse: 103 92   Resp: 17 14   Temp: 98.1 °F (36.7 °C)    TempSrc: Oral    SpO2: 95%    Weight: 106.6 kg    Height: 182.9 cm (6')      *I personally reviewed and interpreted all ED vitals.    Pulse Ox: 95%, Room air, Normal     EKG interpretation above independently interpreted by me    Monitor Interpretation:   normal sinus rhythm independently interpreted by me    Differential Diagnosis/ Diagnostic Considerations: Right sided chest wall pain consider rib fracture consider muscle strain consider renal colic doubt PE    Medical Record Review: I personally reviewed available prior medical records for any recent pertinent discharge summaries, testing, and procedures and reviewed those reports and found office visit Dr. Hendrix 2/12/2024 problem list medication list reviewed.    Complicating Factors: The  patient already has hypertension hyperlipidemia which contribute to the complexity of this ED evaluation.    Social determinants of health:    Prescription drug management:      Shared Decision Making:    ED Course: D-dimer elevated will proceed with CT rule out PE.    PE study noted will start heparin.    Discussion of management with other healthcare providers: Discussed with Dr. Alberto the hospitalist consulted Dr. Anel mars.    Condition upon leaving the department: Stable      Admission disposition: 10/24/2024  8:16 AM       I spent a total of 35 minutes of critical care time in obtaining history, performing a physical exam, bedside monitoring of interventions, collecting and interpreting tests and discussion with consultants but not including time spent performing procedures.      Medical Decision Making      Disposition and Plan     Clinical Impression:  1. Acute pulmonary embolism, unspecified pulmonary embolism type, unspecified whether acute cor pulmonale present (HCC)         Disposition:  Admit  10/24/2024  8:16 am    Follow-up:  No follow-up provider specified.        Medications Prescribed:  Current Discharge Medication List              Supplementary Documentation:         Hospital Problems       Present on Admission  Date Reviewed: 2/13/2024            ICD-10-CM Noted POA    * (Principal) Acute pulmonary embolism, unspecified pulmonary embolism type, unspecified whether acute cor pulmonale present (HCC) I26.99 10/24/2024 Unknown

## 2024-10-24 NOTE — ED QUICK NOTES
Orders for admission, patient is aware of plan and ready to go upstairs. Any questions, please call ED RN Ari at extension [phone].     Patient Covid vaccination status: Fully vaccinated     COVID Test Ordered in ED: None    COVID Suspicion at Admission: N/A    Running Infusions:    continuous dose heparin      None    Mental Status/LOC at time of transport: A+Ox4    Other pertinent information:   CIWA score: N/A   NIH score:  N/A

## 2024-10-24 NOTE — H&P
History and Physical     Rj Jasso Patient Status:  Emergency    1960 MRN [id number]   Location [facility] EMERGENCY DEPARTMENT Attending Eve Atkinson MD   Hosp Day # 0 PCP Frankie Yu MD     Chief Complaint: Right-sided chest pressure    Subjective:    Rj Jasso is a 63 year old male with history of hypertension hyperlipidemia and nephrolithiasis with a recent history of URI for the last 3 to 4 days productive cough no hemoptysis and increasing right sided pressure.  Patient denies any shortness of breath.  Came to the ED due to increased right-sided chest pressure nonpleuritic in nature.  Found to have PE on CT scan.  Not requiring supplemental oxygen in ED.  Patient denies any fevers or chills at home.  He also admits to having diarrhea for the last 2 weeks intermittently.  No nausea or vomiting.  No other acute complaints.  No history of recent travel or sedentary lifestyle.  Patient is very active.  No history of hypercoagulable issues in the family or personal.        History/Other:      Past Medical History:  Past Medical History:    Diverticulosis    Essential hypertension    Nephrolithiasis        Past Surgical History:   Past Surgical History:   Procedure Laterality Date    Removal gallbladder         Social History:  reports that he has been smoking cigars. He has never used smokeless tobacco. He reports that he does not currently use alcohol. He reports that he does not use drugs.    Family History:   Family History   Problem Relation Age of Onset    Heart Disease Father     Dementia Mother         Alzheimer's Disease       Allergies: Allergies[1]    Medications:  Medications Ordered Prior to Encounter[2]    Review of Systems:   A comprehensive 14 point review of systems was completed.    Pertinent positives and negatives noted in the HPI.    Objective:     /81   Pulse 92   Temp 98.1 °F (36.7 °C) (Oral)   Resp 14   Ht 6' (1.829 m)   Wt 235 lb (106.6  kg)   SpO2 95%   BMI 31.87 kg/m²   General: No acute distress.  Alert ,         HEENT: Normocephalic atraumatic. Moist mucous membranes. EOM-I. PERRLA. Anicteric.  Neck: No lymphadenopathy. No JVD. No carotid bruits.  Respiratory: Clear to auscultation bilaterally. No wheezes. No rhonchi.  Cardiovascular: S1, S2. Regular rate and rhythm. No murmurs, rubs or gallops. Equal pulses.   Chest and Back: No tenderness or deformity.  Abdomen: Soft, nontender, nondistended.  Positive bowel sounds. No rebound, guarding or organomegaly.  Neurologic: No focal neurological deficits. CNII-XII grossly intact.  Musculoskeletal: Moves all extremities.  Extremities: No edema or cyanosis.  Integument: No rashes or lesions.   Psychiatric: Appropriate mood and affect.    Results:      Labs:  Labs Last 24 Hours:   BMP     CBC    Other     Na 139 Cl 108 BUN 10 Glu 122   Hb 14.9   PTT 28.3 Procal -   K 4.3 CO2 23.0 Cr 0.93   WBC 12.2 >< .0  INR - CRP -   Renal Lytes Endo    Hct 43.1   Trop - D dim 1.90   eGFR - Ca 9.5 POC Gluc  -    LFT   pBNP - Lactic -   eGFR AA - PO4 - A1c -   AST 20 APk 60 Prot 7.6  LDL -     Mg - TSH -   ALT 39 T margy 0.6 Alb 4.7          COVID-19 Lab Results    COVID-19  No results found for: \"COVID19\"    Pro-Calcitonin  No results for input(s): \"PCT\" in the last 168 hours.    Cardiac  No results for input(s): \"TROP\", \"PBNP\" in the last 168 hours.    Creatinine Kinase  No results for input(s): \"CK\" in the last 168 hours.    Inflammatory Markers  Recent Labs   Lab 10/24/24  0640   DDIMER 1.90*       Imaging: Imaging data reviewed in Epic.    Assessment & Plan:        Acute small volume of pulmonary embolus within both lower lobes in the lobar and segmental pulmonary arteries- 1st episode   Small right basal pulmonary infarct.  No pulmonary artery enlargement.    No cardiac strain on Ct; not requiring supplemental O2  In setting of PT with recent URI symptoms   Iv heparin initiated in ED  Check Covid , RSV, flu  , legionella and strep  Admit to medical floor  Pulmonary on consult  TTE/ US LE venous IP  Telemetry  O2 supplementation prn  Likely transition to po DOAC in AM if remains stable\  shold fup as OP with hematology to determine need for further hypercoagulable wup and LOT      HTN: HL:  Stable  Resume home meds    Quality:  DVT Prophylaxis: iv heparin  CODE status: full       Plan of care discussed with ER, pulmonary, pt ,RN    Eve Atkinson MD  10/24/2024    Supplementary Documentation:          **Certification      PHYSICIAN Certification of Need for Inpatient Hospitalization - Initial Certification    Patient will require inpatient services that will reasonably be expected to span two midnight's based on the clinical documentation in H+P.   Based on patients current state of illness, I anticipate that, after discharge, patient will require TBD.                          [1] No Known Allergies  [2]   No current facility-administered medications on file prior to encounter.     Current Outpatient Medications on File Prior to Encounter   Medication Sig Dispense Refill    nystatin-triamcinolone 100,000-0.1 Units/g-% External Ointment Apply twice daily  Monday-Friday to affected areas of rash. 60 g 3    atorvastatin 10 MG Oral Tab Take 1 tablet (10 mg total) by mouth nightly. 90 tablet 3    lisinopril 10 MG Oral Tab Take 1 tablet (10 mg total) by mouth daily. 90 tablet 3    clotrimazole-betamethasone 1-0.05 % External Cream APPLY EXTERNALLY TO THE AFFECTED AREA TWICE DAILY AS NEEDED 135 g 3

## 2024-10-24 NOTE — ED INITIAL ASSESSMENT (HPI)
Pt reports flu like symptoms x1 1/2 weeks, body aches, non productive cough and diarrhea.  Denies fever.  Pt reports pain to right upper flank 7/10 starting around 0300am, pt took Tylenol around 0900pm last night.  Pt reports difficulty taking a deep breath d/t pain.  A&O x3.

## 2024-10-25 VITALS
BODY MASS INDEX: 31.83 KG/M2 | RESPIRATION RATE: 20 BRPM | DIASTOLIC BLOOD PRESSURE: 57 MMHG | WEIGHT: 235 LBS | OXYGEN SATURATION: 92 % | SYSTOLIC BLOOD PRESSURE: 115 MMHG | HEIGHT: 72 IN | HEART RATE: 77 BPM | TEMPERATURE: 98 F

## 2024-10-25 LAB
ANION GAP SERPL CALC-SCNC: 7 MMOL/L (ref 0–18)
APTT PPP: 106.3 SECONDS (ref 23–36)
APTT PPP: 59.8 SECONDS (ref 23–36)
BUN BLD-MCNC: 13 MG/DL (ref 9–23)
BUN/CREAT SERPL: 15.9 (ref 10–20)
CALCIUM BLD-MCNC: 9.3 MG/DL (ref 8.7–10.4)
CHLORIDE SERPL-SCNC: 106 MMOL/L (ref 98–112)
CO2 SERPL-SCNC: 23 MMOL/L (ref 21–32)
CREAT BLD-MCNC: 0.82 MG/DL
DEPRECATED RDW RBC AUTO: 40.8 FL (ref 35.1–46.3)
EGFRCR SERPLBLD CKD-EPI 2021: 97 ML/MIN/1.73M2 (ref 60–?)
ERYTHROCYTE [DISTWIDTH] IN BLOOD BY AUTOMATED COUNT: 12.6 % (ref 11–15)
GLUCOSE BLD-MCNC: 119 MG/DL (ref 70–99)
HCT VFR BLD AUTO: 39.8 %
HGB BLD-MCNC: 14.1 G/DL
MAGNESIUM SERPL-MCNC: 2.1 MG/DL (ref 1.6–2.6)
MCH RBC QN AUTO: 31.2 PG (ref 26–34)
MCHC RBC AUTO-ENTMCNC: 35.4 G/DL (ref 31–37)
MCV RBC AUTO: 88.1 FL
OSMOLALITY SERPL CALC.SUM OF ELEC: 283 MOSM/KG (ref 275–295)
PLATELET # BLD AUTO: 304 10(3)UL (ref 150–450)
POTASSIUM SERPL-SCNC: 3.8 MMOL/L (ref 3.5–5.1)
RBC # BLD AUTO: 4.52 X10(6)UL
SODIUM SERPL-SCNC: 136 MMOL/L (ref 136–145)
WBC # BLD AUTO: 10.8 X10(3) UL (ref 4–11)

## 2024-10-25 PROCEDURE — 99239 HOSP IP/OBS DSCHRG MGMT >30: CPT | Performed by: INTERNAL MEDICINE

## 2024-10-25 PROCEDURE — 99233 SBSQ HOSP IP/OBS HIGH 50: CPT | Performed by: INTERNAL MEDICINE

## 2024-10-25 RX ORDER — AZITHROMYCIN 250 MG/1
TABLET, FILM COATED ORAL
Qty: 3 TABLET | Refills: 0 | Status: SHIPPED | OUTPATIENT
Start: 2024-10-26 | End: 2024-10-28 | Stop reason: ALTCHOICE

## 2024-10-25 RX ORDER — ACETAMINOPHEN AND CODEINE PHOSPHATE 300; 30 MG/1; MG/1
1-2 TABLET ORAL EVERY 4 HOURS PRN
Qty: 20 TABLET | Refills: 0 | Status: SHIPPED | OUTPATIENT
Start: 2024-10-25 | End: 2024-11-14

## 2024-10-25 NOTE — PROGRESS NOTES
Pulmonary Medicine Inpatient Progress Note           Consult requested by Dr. Atkinson for pulmonary embolism.        SUBJECTIVE:   Feels better. Feels ready to go home.        ALLERGIES:  Allergies[1]     MEDS:  Home Medications:  Medications Taking[2]  Scheduled Medication:   atorvastatin  10 mg Oral Nightly    lisinopril  10 mg Oral Daily    azithromycin  500 mg Oral Daily     Continuous Infusing Medication:   continuous dose heparin 2,300 Units/hr (10/25/24 0970)     PRN Medications:    acetaminophen    polyethylene glycol (PEG 3350)    sennosides    bisacodyl    fleet enema    melatonin    ondansetron    metoclopramide    benzonatate    influenza virus vaccine PF    HYDROcodone-acetaminophen       PHYSICAL EXAM:  /57 (BP Location: Right arm)   Pulse 77   Temp 98 °F (36.7 °C) (Oral)   Resp 20   Ht 6' (1.829 m)   Wt 235 lb (106.6 kg)   SpO2 92%   BMI 31.87 kg/m²   CONSTITUTIONAL: alert, oriented, no apparent distress  HEENT: atraumatic normocephalic  MOUTH: mucous membranes are moist. No OP exudates  NECK/THROAT: no JVD. Trachea midline. No obvious thyromegaly  LUNG: clear b/l no wheezing, + faint bibasilar crackles. Chest symmetric with respiratory motion  HEART: regular rate and rhythm, no obvious murmers or gallops note  ABD: soft non tender. + bowel sounds. No organomegaly noted  EXT: no clubbing, cyanosis, or edema noted. Pulses intact grossly  NEURO/MUSCULOSKELETAL: no gross deficits  SKIN: warm, dry. No obvious lesions noted  LYMPH: no obvious LAD       IMAGES:  Chest CTA  FINDINGS:   CARDIAC: Heart is not enlarged. There are coronary artery calcifications. No pericardial effusion.   MEDIASTINUM/EMELY: Scattered subcentimeter and borderline enlarged lymph nodes at the mediastinum and emely.  Calcified subcarinal node/granuloma.  10 x 13 mm and 11 x 12 mm right hilar lymphadenopathy.   LUNGS/PLEURA: Central airways are patent. Minimal dependent subsegmental atelectasis in the lung bases.  Patchy  ground-glass opacity involving the posterior and lateral basal segments of the right lower lobe with upstream pulmonary embolus. Calcified   granuloma in the left lower lobe.  Trace/small bilateral pleural effusions slightly larger on the right.  No pneumothorax.   VASCULATURE: Main pulmonary artery is not enlarged.  Acute.  In thrombosis within the peripheral aspects of the right lower lobar artery extending into the segmental and subsegmental branches.  Additional thrombi embolus within the left lower lobar   artery and segmental branches.   CHEST WALL: No axillary or clavicular lymphadenopathy.    LIMITED ABDOMEN: Post cholecystectomy.  No significant biliary ductal dilatation.   BONES: Scattered endplate osteophytes and Schmorl's nodes within the spine.  No suspicious bone lesion.  Orthopedic screw in the left glenoid.       LABS:  Recent Labs   Lab 10/24/24  0638 10/25/24  0440   RBC 4.89 4.52   HGB 14.9 14.1   HCT 43.1 39.8   MCV 88.1 88.1   MCH 30.5 31.2   MCHC 34.6 35.4   RDW 12.7 12.6   NEPRELIM 9.99*  --    WBC 12.2* 10.8   .0 304.0       Recent Labs   Lab 10/24/24  0638 10/25/24  0440   * 119*   BUN 10 13   CREATSERUM 0.93 0.82   EGFRCR 91 97   CA 9.5 9.3   ALB 4.7  --     136   K 4.3 3.8    106   CO2 23.0 23.0   ALKPHO 60  --    AST 20  --    ALT 39  --    BILT 0.6  --    TP 7.6  --        ASSESSMENT/PLAN:  1.Acute PE  -heparin gtt--->eliquis  -checked TTE (relatively unremarkable) and LE dopplers (left DVT)  -COVID neg  -supp 02 PRN-on RA  -pain control    2.Pulmonary infarct vs PNA  -can give short coarse of azithromycin   -repeat CT as outpt in 3 months    3. Hilar LN enlargement  -maybe reactive  -repeat outpt chest imaging     4.Proph  -DVT: hep gtt    Ok for discharge from pulmonary standpt. Instructed to f/u with us in clinic in 2-3 weeks. Will need outpt hem consult as well.      Thank you for the opportunity to care for Rj Anguiano DO,  MPH  Pulmonary Critical Care Medicine  [facility]        [1] No Known Allergies  [2]   Outpatient Medications Marked as Taking for the 10/24/24 encounter (Hospital Encounter)   Medication Sig Dispense Refill    [START ON 10/26/2024] azithromycin 250 MG Oral Tab Take one tablet for 3 days 3 tablet 0    acetaminophen-codeine 300-30 MG Oral Tab Take 1-2 tablets by mouth every 4 (four) hours as needed for Pain. 20 tablet 0    apixaban 5 MG Oral Tab Take 2 tabs (10mg) by mouth twice daily for 7 days, then take 1 tab (5mg) by mouth twice daily thereafter. 74 tablet 0    nystatin-triamcinolone 100,000-0.1 Units/g-% External Ointment Apply twice daily  Monday-Friday to affected areas of rash. 60 g 3    atorvastatin 10 MG Oral Tab Take 1 tablet (10 mg total) by mouth nightly. 90 tablet 3    lisinopril 10 MG Oral Tab Take 1 tablet (10 mg total) by mouth daily. 90 tablet 3    clotrimazole-betamethasone 1-0.05 % External Cream APPLY EXTERNALLY TO THE AFFECTED AREA TWICE DAILY AS NEEDED 135 g 3

## 2024-10-25 NOTE — PROGRESS NOTES
Discussed heparin gtt with oncoming nurse and  as pt is currently off the floor. Infusing at 2100 units, 21 ml/hr.

## 2024-10-25 NOTE — PLAN OF CARE
Problem: Patient Centered Care  Goal: Patient preferences are identified and integrated in the patient's plan of care  Description: Interventions:  - What would you like us to know as we care for you? Requested midnight vitals be missed so he could try to sleep  - Provide timely, complete, and accurate information to patient/family  - Incorporate patient and family knowledge, values, beliefs, and cultural backgrounds into the planning and delivery of care  - Encourage patient/family to participate in care and decision-making at the level they choose  - Honor patient and family perspectives and choices  Outcome: Progressing     Problem: Patient/Family Goals  Goal: Patient/Family Long Term Goal  Description: Patient's Long Term Goal: discharge    Interventions:  - Monitor vital signs, labs, test results  - Oxygen and respiratory therapy as needed  - Pain management  - Follow MD orders  - Administer medications per MD order  - Diagnostics per order  - Update /  inform patient on plan of care  - Discharge planning  - See additional Care Plan goals for specific interventions  Outcome: Progressing  Goal: Patient/Family Short Term Goal  Description: Patient's Short Term Goal: make heparin gtt therapeutic    Interventions:   - Follow MD orders  - Follow heparin titration protocol  - See additional Care Plan goals for specific interventions  Outcome: Progressing     Problem: PAIN - ADULT  Goal: Verbalizes/displays adequate comfort level or patient's stated pain goal  Description: INTERVENTIONS:  - Encourage pt to monitor pain and request assistance  - Assess pain using appropriate pain scale  - Administer analgesics based on type and severity of pain and evaluate response  - Implement non-pharmacological measures as appropriate and evaluate response  - Consider cultural and social influences on pain and pain management  - Manage/alleviate anxiety  - Utilize distraction and/or relaxation techniques  - Monitor for opioid  side effects  - Notify MD/LIP if interventions unsuccessful or patient reports new pain  - Anticipate increased pain with activity and pre-medicate as appropriate  Outcome: Progressing     Problem: SAFETY ADULT - FALL  Goal: Free from fall injury  Description: INTERVENTIONS:  - Assess pt frequently for physical needs  - Identify cognitive and physical deficits and behaviors that affect risk of falls.  - [place] fall precautions as indicated by assessment.  - Educate pt/family on patient safety including physical limitations  - Instruct pt to call for assistance with activity based on assessment  - Modify environment to reduce risk of injury  - Provide assistive devices as appropriate  - Consider OT/PT consult to assist with strengthening/mobility  - Encourage toileting schedule  Outcome: Progressing     Problem: DISCHARGE PLANNING  Goal: Discharge to home or other facility with appropriate resources  Description: INTERVENTIONS:  - Identify barriers to discharge w/pt and caregiver  - Include patient/family/discharge partner in discharge planning  - Arrange for needed discharge resources and transportation as appropriate  - Identify discharge learning needs (meds, wound care, etc)  - Arrange for interpreters to assist at discharge as needed  - Consider post-discharge preferences of patient/family/discharge partner  - Complete POLST form as appropriate  - Assess patient's ability to be responsible for managing their own health  - Refer to Case Management Department for coordinating discharge planning if the patient needs post-hospital services based on physician/LIP order or complex needs related to functional status, cognitive ability or social support system  Outcome: Progressing     Problem: RESPIRATORY - ADULT  Goal: Achieves optimal ventilation and oxygenation  Description: INTERVENTIONS:  - Assess for changes in respiratory status  - Assess for changes in mentation and behavior  - Position to facilitate  oxygenation and minimize respiratory effort  - Oxygen supplementation based on oxygen saturation or ABGs  - Provide Smoking Cessation handout, if applicable  - Encourage broncho-pulmonary hygiene including cough, deep breathe, Incentive Spirometry  - Assess the need for suctioning and perform as needed  - Assess and instruct to report SOB or any respiratory difficulty  - Respiratory Therapy support as indicated  - Manage/alleviate anxiety  - Monitor for signs/symptoms of CO2 retention  Outcome: Progressing     Monitoring vital signs, stable at this moment. Pain medication provided as needed. Call light within reach, bed locked in lowest position, all fall precautions in place. All needs addressed. Frequent rounding maintained by nursing staff. Patient completing IV heparin per orders, adjusted per protocol. New IV placed. No acute changes at this time.

## 2024-10-25 NOTE — CM/SW NOTE
CM called patient's pharmacy [id number] - [place], [place] - [address] AT [facility], [phone], [phone] and confirmed first co-pay will be $400 until deductible has been met.  Subsequent co-pay will be $40 monthly.  Patient's preferred pharmacy will not have Xarelto in stock until Monday - but prescription was sent to [facility] ([address] [place] [address]) and is filled and ready for .    CM met with patient at bedside and explained above.  Patient agreeable to co-pay amount and  at [facility].  Patient provided with 30 day free trial coupon to be presented to pharmacy at time of .    BEV Blake notified of above.    Patient cleared for discharge from CM/SW standpoint.    Janette Lewis RN, BSN  Nurse   [phone]

## 2024-10-25 NOTE — PLAN OF CARE
Discharged to home with wife. Pt to continue xarelto, to follow up with PCP, hematology and pulmonology. Voiced no questions or concerns.      Problem: PAIN - ADULT  Goal: Verbalizes/displays adequate comfort level or patient's stated pain goal  Description: INTERVENTIONS:  - Encourage pt to monitor pain and request assistance  - Assess pain using appropriate pain scale  - Administer analgesics based on type and severity of pain and evaluate response  - Implement non-pharmacological measures as appropriate and evaluate response  - Consider cultural and social influences on pain and pain management  - Manage/alleviate anxiety  - Utilize distraction and/or relaxation techniques  - Monitor for opioid side effects  - Notify MD/LIP if interventions unsuccessful or patient reports new pain  - Anticipate increased pain with activity and pre-medicate as appropriate  Outcome: Adequate for Discharge     Problem: SAFETY ADULT - FALL  Goal: Free from fall injury  Description: INTERVENTIONS:  - Assess pt frequently for physical needs  - Identify cognitive and physical deficits and behaviors that affect risk of falls.  - [place] fall precautions as indicated by assessment.  - Educate pt/family on patient safety including physical limitations  - Instruct pt to call for assistance with activity based on assessment  - Modify environment to reduce risk of injury  - Provide assistive devices as appropriate  - Consider OT/PT consult to assist with strengthening/mobility  - Encourage toileting schedule  Outcome: Adequate for Discharge     Problem: DISCHARGE PLANNING  Goal: Discharge to home or other facility with appropriate resources  Description: INTERVENTIONS:  - Identify barriers to discharge w/pt and caregiver  - Include patient/family/discharge partner in discharge planning  - Arrange for needed discharge resources and transportation as appropriate  - Identify discharge learning needs (meds, wound care, etc)  - Arrange for  interpreters to assist at discharge as needed  - Consider post-discharge preferences of patient/family/discharge partner  - Complete POLST form as appropriate  - Assess patient's ability to be responsible for managing their own health  - Refer to Case Management Department for coordinating discharge planning if the patient needs post-hospital services based on physician/LIP order or complex needs related to functional status, cognitive ability or social support system  Outcome: Adequate for Discharge     Problem: RESPIRATORY - ADULT  Goal: Achieves optimal ventilation and oxygenation  Description: INTERVENTIONS:  - Assess for changes in respiratory status  - Assess for changes in mentation and behavior  - Position to facilitate oxygenation and minimize respiratory effort  - Oxygen supplementation based on oxygen saturation or ABGs  - Provide Smoking Cessation handout, if applicable  - Encourage broncho-pulmonary hygiene including cough, deep breathe, Incentive Spirometry  - Assess the need for suctioning and perform as needed  - Assess and instruct to report SOB or any respiratory difficulty  - Respiratory Therapy support as indicated  - Manage/alleviate anxiety  - Monitor for signs/symptoms of CO2 retention  Outcome: Adequate for Discharge

## 2024-10-28 ENCOUNTER — TELEPHONE (OUTPATIENT)
Age: 64
End: 2024-10-28

## 2024-10-28 ENCOUNTER — PATIENT OUTREACH (OUTPATIENT)
Dept: CASE MANAGEMENT | Age: 64
End: 2024-10-28

## 2024-10-28 DIAGNOSIS — Z02.9 ENCOUNTERS FOR ADMINISTRATIVE PURPOSE: Primary | ICD-10-CM

## 2024-10-28 DIAGNOSIS — I26.99 ACUTE PULMONARY EMBOLISM, UNSPECIFIED PULMONARY EMBOLISM TYPE, UNSPECIFIED WHETHER ACUTE COR PULMONALE PRESENT (HCC): ICD-10-CM

## 2024-10-28 PROCEDURE — 1111F DSCHRG MED/CURRENT MED MERGE: CPT

## 2024-10-28 NOTE — PROGRESS NOTES
Transitional Care Management   Discharge Date: 10/25/24  Contact Date: 10/28/2024    Assessment:    TCM Initial Assessment    General:  Assessment completed with: Patient  Patient Subjective: Spoke with patient who reports he has been doing a little bit better. Started taking his blood thinner. The patient reports the right sided chest pain has improved greatly since being home from the hospital. Patient states he is able to move more and do more activities. The patient denies shortness of breath, fever, chills, nausea, vomiting, diarrhea. The patient reports his cough as improved. The patient had various questions on what a pulmonary embolism is and causes-- [facility] provided education on this and answered the patient's questions to the best of my knowledge. The patient had no other questions. The patient denies any concerns at this time.  Chief Complaint: Acute pulmonary embolism, unspecified pulmonary embolism type, unspecified whether acute cor pulmonale present (HCC)  Verify patient name and  with patient/ caregiver: Yes    Hospital Stay/Discharge:  Tell me what you understand of why you were in the hospital or emergency department: a blood clot  Prior to leaving the hospital were your Discharge Instructions reviewed with you?: Yes  Did you receive a copy of your written Discharge Instructions?: Yes  What questions do you have about your Discharge Instructions?: patient denies at this time  Do you feel better or worse since you left the hospital or emergency department?: Better    Follow - Up Appointment:  Do you have a follow-up appointment?: Yes  Date: 10/30/24  Physician: Dr. Anguiano  Are there any barriers to getting to your follow-up appointment?: No    Home Health/DME:  Prior to leaving the hospital was Home Health (HH) arranged for you?: N/A  Are HH needs identified by staff during the assessment?: No     Prior to leaving the hospital or emergency department was Durable Medical Equipment (DME), medical  supplies, or infusions arranged for you?: N/A  Are DME/medical supply/infusions needs identified by staff during this assessment?: No     Medications/Diet:  Did any of your medications change, during or after your hospital stay or ED visit?: Yes  Do you have your new or updated medications?: Yes  Do you understand what your medications are for and possible side effects?: Yes  Are there any reasons that keep you from taking your medication as prescribed?: No  Any concerns about medication refills?: No    Were you given a different diet per your Discharge Instructions?: Yes  Diet Type: Healthy diet  Reason: dx of a PE  Are there any barriers to following that diet?: No     Questions/Concerns:  Do you have any questions or concerns that have not been discussed?: Yes       Nursing Interventions:    NCM provided education -- Pulmonary embolism (Reviewed lifestyle changes, risk factors, and causes)      Reviewed blood thinner and advised bleeding precautions     TCM/HFU confirmed for 11/06/2024    Pulmonary appointment scheduled for 10/30/2024    I spoke to the patient's wife and she confirmed she has the contact information to Hematology and will call for an appointment. I provided names of different providers in that group as she requested    All d/c instructions reviewed with pt.  Reviewed when to call MD vs when to go to ER/call 911.  Educated pt on the importance of taking all meds as prescribed as well as close f/u with PCP/specialists.  Pt verbalized understanding and will contact office with any further questions or concerns.         Medication Review:   Current Outpatient Medications   Medication Sig Dispense Refill    azithromycin 250 MG Oral Tab Take one tablet for 3 days 3 tablet 0    acetaminophen-codeine 300-30 MG Oral Tab Take 1-2 tablets by mouth every 4 (four) hours as needed for Pain. 20 tablet 0    rivaroxaban 15 & 20 MG Oral Tablet Therapy Pack Take As Directed based on package instructions: Days 1-21:  15 mg by mouth twice daily Days 22-30: 20 mg by mouth once daily 1 each 0    nystatin-triamcinolone 100,000-0.1 Units/g-% External Ointment Apply twice daily  Monday-Friday to affected areas of rash. 60 g 3    atorvastatin 10 MG Oral Tab Take 1 tablet (10 mg total) by mouth nightly. 90 tablet 3    lisinopril 10 MG Oral Tab Take 1 tablet (10 mg total) by mouth daily. 90 tablet 3    clotrimazole-betamethasone 1-0.05 % External Cream APPLY EXTERNALLY TO THE AFFECTED AREA TWICE DAILY AS NEEDED 135 g 3     Did patient review medications using current pill bottles and not just a medication list?  Yes  Discharge medications reviewed/discussed/and reconciled against outpatient medications with patient.  Any changes or updates to medications sent to primary care provider.  Patient Acknowledged    SDOH:   Transportation Needs: No Transportation Needs (10/24/2024)    Transportation Needs     Lack of Transportation: No     Car Seat: Not on file     Financial Resource Strain: Low Risk  (10/28/2024)    Financial Resource Strain     Difficulty of Paying Living Expenses: Not hard at all     Med Affordability: Not on file       Follow-up Appointments:  Your appointments       Date & Time Appointment Department ([place])    Oct 30, 2024 10:30 AM CDT Exam - Established with Brayan Anguiano DO [facility] ([facility])        Nov 06, 2024 10:20 AM [facility] Follow Up with Frankie Yu MD [facility] ([facility])              [facility]  [address]  [facility] [id number] [phone] [facility]  [address] [facility] [id number] [phone]            Transitional Care Clinic  Was TCC Ordered: No    Primary Care Provider (If no TCC appointment)  Does patient already  have a PCP appointment scheduled? Yes  Nurse Care Manager Confirmed PCP office TCM appointment with patient      Specialist  Does the patient have any other follow-up appointment(s) that need to be scheduled? Yes-- Patient's wife will call to schedule the appointment    -If yes: Nurse Care Manager reviewed upcoming specialist appointments with patient: Yes    Hematology/Oncology    -Does the patient need assistance scheduling appointment(s): No    CCM referral placed:  Not Applicable

## 2024-10-28 NOTE — TELEPHONE ENCOUNTER
Pt is calling to schedule a new consult appt.    New Consult- Any  Referred by-pt was admitted in [facility]   Reason-Blood clot (pt on blood thinners)  Insurance-Medicare/[employer]    Please give pt a call back. Thank you.

## 2024-10-28 NOTE — DISCHARGE SUMMARY
Discharge Summary     Rj Jasso Patient Status:  Inpatient    1960 MRN [id number]   Location [facility] Attending No att. providers found   Hosp Day # 1 PCP Frankie Yu MD     Date of Admission: 10/24/2024  Date of Discharge: 10/25/2024  Discharge Disposition: Home or Self Care    Discharge Diagnosis:   Acute small volume of pulmonary embolus within both lower lobes in the lobar and segmental pulmonary arteries- 1st episode  Left lower extremity DVT      History of Present Illness:             Brief Synopsis:     Acute small volume of pulmonary embolus within both lower lobes in the lobar and segmental pulmonary arteries- 1st episode  Left lower extremity DVT   Small right basal pulmonary infarct.  No pulmonary artery enlargement.    No cardiac strain on Ct; not requiring supplemental O2  In setting of PT with recent URI symptoms   Iv heparin initiated in ED  Negative Covid , RSV, flu , legionella and strep  Pulmonary on consult  TTE showed no evidence of cardiac strain  Telemetry  O2 supplementation prn  Transition to DOAC at discharge and discharged in stable condition.  Referral provided for OP with hematology to determine need for further hypercoagulable wup and LOT        HTN: HL:  Stable  Resume home meds      Lace+ Score: 34  59-90 High Risk  29-58 Medium Risk  0-28   Low Risk       TCM Follow-Up Recommendation:  LACE < 29: Low Risk of readmission after discharge from the hospital. No TCM follow-up needed.  **Certification    Admission date was 10/24/2024.  Inpatient stay was shorter than expected.  Patient's Acute pulmonary embolism, unspecified pulmonary embolism type, unspecified whether acute cor pulmonale present (HCC) was initially serious enough to expect a more lengthy hospitalization but patient improved faster than expected.                 Consultants:  pulmonary    Discharge Medication List:     Discharge Medications        START taking these medications         Instructions Prescription details   acetaminophen-codeine 300-30 MG Tabs  Commonly known as: Tylenol #3      Take 1-2 tablets by mouth every 4 (four) hours as needed for Pain.   Stop taking on: November 14, 2024  Quantity: 20 tablet  Refills: 0     rivaroxaban 15 & 20 MG Tbpk  Notes to patient: EVERY 12 HOURS      Take As Directed based on package instructions: Days 1-21: 15 mg by mouth twice daily Days 22-30: 20 mg by mouth once daily   Quantity: 1 each  Refills: 0            CONTINUE taking these medications        Instructions Prescription details   atorvastatin 10 MG Tabs  Commonly known as: Lipitor      Take 1 tablet (10 mg total) by mouth nightly.   Quantity: 90 tablet  Refills: 3     clotrimazole-betamethasone 1-0.05 % Crea  Commonly known as: Lotrisone      APPLY EXTERNALLY TO THE AFFECTED AREA TWICE DAILY AS NEEDED   Quantity: 135 g  Refills: 3     lisinopril 10 MG Tabs  Commonly known as: Zestril      Take 1 tablet (10 mg total) by mouth daily.   Quantity: 90 tablet  Refills: 3     nystatin-triamcinolone 100,000-0.1 Units/g-% Oint  Commonly known as: Mycolog II      Apply twice daily  Monday-Friday to affected areas of rash.   Quantity: 60 g  Refills: 3               Where to Get Your Medications        These medications were sent to [id number] - [place], [place] - [address] [facility] [address] [facility], [phone], [phone] [address] [address]      Phone: [phone]   acetaminophen-codeine 300-30 MG Tabs  rivaroxaban 15 & 20 MG Tbpk         Follow-up appointment:   [facility]  [address]  [facility] [address] [phone]  Schedule an appointment as soon as possible for a visit in 1 month(s)      Frankie Yu MD  [address] [facility] [address] [phone]    Call  As needed    Brayan Anguiano DO  [address] [facility]  [address]  [facility] [address] [phone]    Schedule an appointment as  soon as possible for a visit      Appointments for Next 30 Days 10/28/2024 - 11/27/2024        Date Arrival Time Visit Type Length Department Provider     10/30/2024 10:30 AM  EXAM - ESTABLISHED [2844] 15 min [facility] Brayan Anguiano DO    Patient Instructions:         Location Instructions:     Your appointment is located at [address] in [place], [place].&nbsp; Please park in the [address] lot, enter through the [facility] entrance, and go to [address].  Masks are optional for all patients and visitors, unless otherwise indicated.               11/6/2024 10:20 AM  [facility] FOLLOW UP [6010] 20 min [facility] Frankie Yu MD    Patient Instructions:         Location Instructions:     Your appointment is scheduled at [address] [place] [address]  Masks are optional for all patients and visitors, unless otherwise indicated.                      Supplementary Documentation:   [facility] reviewed: na    Vital signs:       Physical Exam:    General:  NAD  Cardiovascular:  S1, S2    -----------------------------------------------------------------------------------------------  PATIENT DISCHARGE INSTRUCTIONS: See electronic chart    Tip: Documentation requirements: For split shared discharge, BOTH providers need to document specific floor, unit, and time spent on the discharge.  The note needs to be signed by the provider with > 50% of time and bill under their NPI.   Time spent:  45 min         Eve Atkinson MD

## 2024-10-29 NOTE — PROGRESS NOTES
Outpatient Pulmonary Clinic Follow Up           Reason for encounter: f/u       SUBJECTIVE:  Pt seen for f/u on 10/30/24.  Seen inpt earlier in the month for chest pain. Diagnosed with PE, DVT, possible pulmonary infarct +/- PNA. Started on anticoagulation and discharged on Xarelto. Also treated with short course of Azithromycin for possible PNA.   Overall, he feels. No bleeding with being on Xarelto.   Completed antibiotics as well.   Denies any respiratory issues.   Quit smoking.       From the inpt consultation 10/2024:  \"HPI: 64 yo male with hx of HTN admitted with right sided chest pain, shortness of breath, coughing, and URI symptoms x 2 days. Reports he tested neg for COVID at home.   In the ED, found to have pulmonary embolism, possible pulmonary infarct.   Pt denies previous hx of blood clots, no family hx of blood clots. No recent travel or immobilization.  Assessment and Plan:  1.Acute PE  -heparin gtt--->eliquis  -checked TTE (relatively unremarkable) and LE dopplers (left DVT)  -COVID neg  -supp 02 PRN-on RA  -pain control  2.Pulmonary infarct vs PNA  -can give short coarse of azithromycin   -repeat CT as outpt in 3 months  3. Hilar LN enlargement  -maybe reactive  -repeat outpt chest imaging   4.Proph  -DVT: hep gtt  Ok for discharge from pulmonary standpt. Instructed to f/u with us in clinic in 2-3 weeks. Will need outpt hem consult as well\".       REVIEW OF SYSTEMS:  Positives and negatives as mentioned in the HPI above. Remainder of 12 pt review of systems is otherwise negative.       PAST MEDICAL HISTORY:  Past Medical History:    Diverticulosis    Essential hypertension    Nephrolithiasis        PAST SURGICAL HISTORY:  Past Surgical History:   Procedure Laterality Date    Removal gallbladder  2003        PAST FAMILY HISTORY:  Family History   Problem Relation Age of Onset    Heart Disease Father     Dementia Mother         Alzheimer's Disease        PAST SOCIAL HISTORY:  Social History      Socioeconomic History    Marital status:    Tobacco Use    Smoking status: Every Day     Types: Cigars    Smokeless tobacco: Never    Tobacco comments:     1 unit per day   Vaping Use    Vaping status: Never Used   Substance and Sexual Activity    Alcohol use: Not Currently    Drug use: No    Sexual activity: Not Currently     Partners: Female   Other Topics Concern    History of tanning No    Reaction to local anesthetic No    Pt has a pacemaker No    Pt has a defibrillator No     Social Drivers of Health     Financial Resource Strain: Low Risk  (10/28/2024)    Financial Resource Strain     Difficulty of Paying Living Expenses: Not hard at all   Food Insecurity: No Food Insecurity (10/24/2024)    Food Insecurity     Food Insecurity: Never true   Transportation Needs: No Transportation Needs (10/24/2024)    Transportation Needs     Lack of Transportation: No   Housing Stability: Low Risk  (10/24/2024)    Housing Stability     Housing Instability: No   Quit smoking 10/2024       ALLERGIES:  Allergies[1]       MEDS:  Medications Ordered Prior to Encounter[2]       PHYSICAL EXAM:  Blood pressure 150/85, pulse 100, resp. rate 16, height 6' (1.829 m), weight 230 lb (104.3 kg), SpO2 98%.  GEN: alert, oriented, pleasant, no apparent distress  HEENT: atraumatic normocephalic  MOUTH: mucous membranes are moist. No OP exudates.  NECK: no JVD. Trachea midline. No obvious thyromegaly  LUNG: clear b/l no wheezing, crackles. Chest symmetric with respiratory motion  HEART: regular rate and rhythm, no obvious murmers or gallops noted  ABD: soft non tender. + bowel sounds. No organomegaly noted  EXT: no clubbing, cyanosis, or edema noted. Pulses intact grossly  NEURO: no gross deficits  SKIN: warm, dry. No obvious lesions noted  LYMPH: no obvious LAD       IMAGES:  LE dopplers 10/2024  CONCLUSION:   DVT within the left peroneal vein.   No DVT within the right lower extremity       TTE 10/2024  Conclusions:  1. Left  ventricle: The cavity size was normal. Wall thickness was normal.      Systolic function was normal. The estimated ejection fraction was 55-60%,      by visual assessment. No diagnostic evidence for regional wall motion      abnormalities. Left ventricular diastolic function parameters were      normal.   2. Right ventricle: The cavity size was mildly increased. Systolic function      was normal.   3. Left atrium: The left atrial volume was normal.   4. Pulmonary arteries: Systolic pressure was within the normal range.   Impressions:  No previous study was available for comparison.       Chest CTA 10/2024  FINDINGS:   CARDIAC: Heart is not enlarged. There are coronary artery calcifications. No pericardial effusion.   MEDIASTINUM/EMELY: Scattered subcentimeter and borderline enlarged lymph nodes at the mediastinum and emely. Calcified subcarinal node/granuloma.  10 x 13 mm and 11 x 12 mm right hilar lymphadenopathy.   LUNGS/PLEURA: Central airways are patent. Minimal dependent subsegmental atelectasis in the lung bases. Patchy ground-glass opacity involving the posterior and lateral basal segments of the right lower lobe with upstream pulmonary embolus. Calcified granuloma in the left lower lobe.  Trace/small bilateral pleural effusions slightly larger on the right.  No pneumothorax.   VASCULATURE: Main pulmonary artery is not enlarged.  Acute.  In thrombosis within the peripheral aspects of the right lower lobar artery extending into the segmental and subsegmental branches.  Additional thrombi embolus within the left lower lobar   artery and segmental branches.   CHEST WALL: No axillary or clavicular lymphadenopathy.    LIMITED ABDOMEN: Post cholecystectomy.  No significant biliary ductal dilatation.   BONES: Scattered endplate osteophytes and Schmorl's nodes within the spine.  No suspicious bone lesion. Orthopedic screw in the left glenoid.        LABS:  Lab Results   Component Value Date    WBC 10.8 10/25/2024     RBC 4.52 10/25/2024    HGB 14.1 10/25/2024    HCT 39.8 10/25/2024    MCV 88.1 10/25/2024    MCH 31.2 10/25/2024    MCHC 35.4 10/25/2024    MPV 7.9 04/01/2015     Recent Labs   Lab 10/24/24  0638 10/25/24  0440   * 119*   BUN 10 13   CREATSERUM 0.93 0.82   EGFRCR 91 97   CA 9.5 9.3   ALB 4.7  --     136   K 4.3 3.8    106   CO2 23.0 23.0   ALKPHO 60  --    AST 20  --    ALT 39  --    BILT 0.6  --    TP 7.6  --         PFT/SPIROMETRY RESULTS:  None        ASSESSMENT/PLAN:  Acute PE/DVT  -On Xarelto and tolerating it well. Will need at least 3 months therapy  -Planning to see Hematology next month for further evaluation. Defer to them if needs longterm anticoagulation  -Repeat chest CT PE study in 3 months to assess if blood clot and pulmonary opacities have resolved.    Previous smoker quit in 10/2024  -Assess if needs to start LDCT for lung cancer screening at the next visit.   -PFTs if is symptomatic      RTC in 3 months.     Thank you for the opportunity to care for Rj Jasso.  I spent a total of 30 minutes in direct contact with the patient and reviewing pertinent outside records on the day of the encounter.       PARIS Anguiano DO, MPH  Pulmonary and Critical Care Medicine  [facility] Pulmonary and Critical Care Medicine          [1] No Known Allergies  [2]   Current Outpatient Medications on File Prior to Visit   Medication Sig Dispense Refill    acetaminophen-codeine 300-30 MG Oral Tab Take 1-2 tablets by mouth every 4 (four) hours as needed for Pain. (Patient not taking: Reported on 10/28/2024) 20 tablet 0    rivaroxaban 15 & 20 MG Oral Tablet Therapy Pack Take As Directed based on package instructions: Days 1-21: 15 mg by mouth twice daily Days 22-30: 20 mg by mouth once daily 1 each 0    nystatin-triamcinolone 100,000-0.1 Units/g-% External Ointment Apply twice daily  Monday-Friday to affected areas of rash. 60 g 3    atorvastatin 10 MG Oral Tab Take 1 tablet (10 mg total) by  mouth nightly. 90 tablet 3    lisinopril 10 MG Oral Tab Take 1 tablet (10 mg total) by mouth daily. 90 tablet 3    clotrimazole-betamethasone 1-0.05 % External Cream APPLY EXTERNALLY TO THE AFFECTED AREA TWICE DAILY AS NEEDED 135 g 3     No current facility-administered medications on file prior to visit.

## 2024-10-30 ENCOUNTER — OFFICE VISIT (OUTPATIENT)
Dept: PULMONOLOGY | Facility: CLINIC | Age: 64
End: 2024-10-30

## 2024-10-30 VITALS
HEIGHT: 72 IN | HEART RATE: 100 BPM | SYSTOLIC BLOOD PRESSURE: 150 MMHG | OXYGEN SATURATION: 98 % | RESPIRATION RATE: 16 BRPM | DIASTOLIC BLOOD PRESSURE: 85 MMHG | WEIGHT: 230 LBS | BODY MASS INDEX: 31.15 KG/M2

## 2024-10-30 DIAGNOSIS — R91.8 PULMONARY INFILTRATES: ICD-10-CM

## 2024-10-30 DIAGNOSIS — I26.99 ACUTE PULMONARY EMBOLISM WITHOUT ACUTE COR PULMONALE, UNSPECIFIED PULMONARY EMBOLISM TYPE (HCC): Primary | ICD-10-CM

## 2024-10-30 PROCEDURE — 99214 OFFICE O/P EST MOD 30 MIN: CPT | Performed by: INTERNAL MEDICINE

## 2024-10-30 NOTE — PATIENT INSTRUCTIONS
Agree with seeing Dr. Dickinson from Hematology.     In the meantime continue the Xarelto.  Repeat a chest CT scan for 3 months from now.  I will order a chest CT scan.     Come back in 3 months (one or two weeks after the chest CT scan).

## 2024-11-06 ENCOUNTER — OFFICE VISIT (OUTPATIENT)
Dept: INTERNAL MEDICINE CLINIC | Facility: CLINIC | Age: 64
End: 2024-11-06

## 2024-11-06 ENCOUNTER — TELEPHONE (OUTPATIENT)
Dept: INTERNAL MEDICINE CLINIC | Facility: CLINIC | Age: 64
End: 2024-11-06

## 2024-11-06 VITALS
DIASTOLIC BLOOD PRESSURE: 84 MMHG | HEART RATE: 118 BPM | RESPIRATION RATE: 20 BRPM | SYSTOLIC BLOOD PRESSURE: 130 MMHG | BODY MASS INDEX: 30.88 KG/M2 | HEIGHT: 72 IN | TEMPERATURE: 98 F | WEIGHT: 228 LBS

## 2024-11-06 DIAGNOSIS — R10.9 RIGHT FLANK PAIN: ICD-10-CM

## 2024-11-06 DIAGNOSIS — I26.99 ACUTE PULMONARY EMBOLISM WITHOUT ACUTE COR PULMONALE, UNSPECIFIED PULMONARY EMBOLISM TYPE (HCC): Primary | ICD-10-CM

## 2024-11-06 PROCEDURE — 99495 TRANSJ CARE MGMT MOD F2F 14D: CPT | Performed by: INTERNAL MEDICINE
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
## 2024-11-15 ENCOUNTER — TELEPHONE (OUTPATIENT)
Dept: INTERNAL MEDICINE UNIT | Facility: HOSPITAL | Age: 64
End: 2024-11-15

## 2024-11-15 ENCOUNTER — TELEPHONE (OUTPATIENT)
Dept: INTERNAL MEDICINE CLINIC | Facility: CLINIC | Age: 64
End: 2024-11-15

## 2024-11-15 NOTE — TELEPHONE ENCOUNTER
Received call from wife Maira( verbal release on file) regarding prescription for Xarelto. Maira inquiring on refill of medication. States  was discharged from hospital few weeks ago. Writer reviewed chart, order for Xarelto starter pack, per Maira  is on day 21 of 30 day prescription. In reviewing discharge instructions, instructed to follow up with hematologist. Wife states her  has appointment with hematology on 11/26, appointment is after patient will complete Xarelto starter pack. Writer informed Maira to call hematology office and explain above. Discussed importance of not missing dose of medication, Maira verbalized understanding of this. Informed Maira to call back with any further questions or assistance needed. Also discussed contacting Primary Care Physician as Hospitalists do not initiate prescription refills.

## 2024-11-15 NOTE — TELEPHONE ENCOUNTER
Patient  wife Maira calling ( name and date of birth of patient verified ) on Release of Information       Maira states patient has been on Rivaroxaban and will now move forward to the 20mg dose once a day     The RX is from Hospitalist, Maira called hematology for refill and was referred to call PCP       Wife states patient will be out of medication before the next hematology appointment  and is asking Dr Jensen for the medication refill       Allergies reviewed and pharmacy confirmed      Please advise and thank you.      Please reply to pool: EM RN TRIAGE      Original RX   rivaroxaban 15 & 20 MG Oral Tablet Therapy Pack 1 each 0 10/25/2024 --   Sig:   Take As Directed based on package instructions: Days 1-21: 15 mg by mouth twice daily Days 22-30: 20 mg by mouth once daily     Route:   (none)           Future Appointments   Date Time Provider Department Center   11/26/2024  9:00 AM Liyah Dickinson MD [facility] HEM ONC [facility]

## 2024-11-15 NOTE — TELEPHONE ENCOUNTER
Called patient  wife Maira in regards to message below ( identified name and date of birth )     Maira informed RX has been sent,  She is very grateful

## 2024-11-26 ENCOUNTER — OFFICE VISIT (OUTPATIENT)
Dept: HEMATOLOGY/ONCOLOGY | Facility: HOSPITAL | Age: 64
End: 2024-11-26
Attending: STUDENT IN AN ORGANIZED HEALTH CARE EDUCATION/TRAINING PROGRAM
Payer: MEDICARE

## 2024-11-26 VITALS
BODY MASS INDEX: 32.32 KG/M2 | TEMPERATURE: 98 F | WEIGHT: 236 LBS | HEIGHT: 71.5 IN | DIASTOLIC BLOOD PRESSURE: 93 MMHG | HEART RATE: 102 BPM | SYSTOLIC BLOOD PRESSURE: 166 MMHG | RESPIRATION RATE: 16 BRPM | OXYGEN SATURATION: 96 %

## 2024-11-26 DIAGNOSIS — I26.99 ACUTE PULMONARY EMBOLISM WITHOUT ACUTE COR PULMONALE, UNSPECIFIED PULMONARY EMBOLISM TYPE (HCC): Primary | ICD-10-CM

## 2024-11-26 PROCEDURE — 99204 OFFICE O/P NEW MOD 45 MIN: CPT | Performed by: STUDENT IN AN ORGANIZED HEALTH CARE EDUCATION/TRAINING PROGRAM

## 2024-11-26 NOTE — PROGRESS NOTES
Hematology/Oncology Initial Consultation Note    Patient Name: Rj Jasso  Medical Record Number: [id number]    YOB: 1960   Date of Consultation: 11/26/2024   PCP: Frankie Yu MD   Other providers:      Reason for Consultation:  Rj Jasso was seen today for the diagnosis of thromboembolism with pulmonary embolism.     Oncologic History:     October 2024: Small volume pulmonary embolus noted within the bilateral lower lobes and segmental pulmonary arteries.  Small right basilar pulmonary infarct noted.    US of the lower extremity DVT DVT within the left peroneal vein.    ===================================================  History of Present Illness:      64-year-old male with essential hypertension, diverticulosis who used to smoke cigars and quit 1 month ago presented to the clinic to establish care for newly diagnosed venous thromboembolism.  Patient presented to the ER in October after he noticed some chest discomfort.  Prior to that he had noticed some pain in his left calf.  He denies having any shortness of breath or any palpitations or lightheadedness.  Patient was noted to be hemodynamically stable initiated on anticoagulation with Xarelto and discharged home.    Is having any surgical interventions, prolonged immobility, long distance air travel.  No prior history of having venous clots.  Patient has been age appropriately screened for malignancies including having colonoscopy back in 2022 which were benign adenomas.    Denies having any family history of malignancies or venous thromboembolism.  No history of having any arterial clots.  No inflammatory/ autoimmune conditions. No known liver disease.       Past Medical History:  Past Medical History:    Diverticulosis    Essential hypertension    Nephrolithiasis       Past Surgical History:   Procedure Laterality Date    Removal gallbladder  2003       Home Medications:   rivaroxaban 20 MG Oral Tab Take 1 tablet (20 mg total)  by mouth daily with food. 30 tablet 3    rivaroxaban 15 & 20 MG Oral Tablet Therapy Pack Take As Directed based on package instructions: Days 1-21: 15 mg by mouth twice daily Days 22-30: 20 mg by mouth once daily 1 each 0    nystatin-triamcinolone 100,000-0.1 Units/g-% External Ointment Apply twice daily  Monday-Friday to affected areas of rash. 60 g 3    atorvastatin 10 MG Oral Tab Take 1 tablet (10 mg total) by mouth nightly. 90 tablet 3    lisinopril 10 MG Oral Tab Take 1 tablet (10 mg total) by mouth daily. 90 tablet 3    clotrimazole-betamethasone 1-0.05 % External Cream APPLY EXTERNALLY TO THE AFFECTED AREA TWICE DAILY AS NEEDED 135 g 3     -------  Medications Ordered Prior to Encounter[1]    Allergies:   Allergies[2]    Psychosocial History:  Social History     Social History Narrative    Not on file     Social History     Socioeconomic History    Marital status:    Tobacco Use    Smoking status: Former     Types: Cigars     Quit date: 10/23/2024     Years since quittin.0    Smokeless tobacco: Never    Tobacco comments:     1 unit per day   Vaping Use    Vaping status: Never Used   Substance and Sexual Activity    Alcohol use: Not Currently    Drug use: No    Sexual activity: Not Currently     Partners: Female   Other Topics Concern    History of tanning No    Reaction to local anesthetic No    Pt has a pacemaker No    Pt has a defibrillator No     Social Drivers of Health     Financial Resource Strain: Low Risk  (10/28/2024)    Financial Resource Strain     Difficulty of Paying Living Expenses: Not hard at all   Food Insecurity: No Food Insecurity (10/24/2024)    Food Insecurity     Food Insecurity: Never true   Transportation Needs: No Transportation Needs (10/24/2024)    Transportation Needs     Lack of Transportation: No   Housing Stability: Low Risk  (10/24/2024)    Housing Stability     Housing Instability: No       Family Medical History:  Family History   Problem Relation Age of Onset     Heart Disease Father     Dementia Mother         Alzheimer's Disease       Review of Systems:  A 10-point ROS was done with pertinent positives and negative per the HPI    Vital Signs:  Height: 181.6 cm (5' 11.5\") (11/26 0858)  Weight: 107 kg (236 lb) (11/26 0858)  BSA (Calculated - sq m): 2.27 sq meters (11/26 0858)  Pulse: 102 (11/26 0858)  BP: 166/93 (11/26 0858)  Temp: 97.8 °F (36.6 °C) (11/26 0858)  Do Not Use - Resp Rate: --  SpO2: 96 % (11/26 0858)    Wt Readings from Last 6 Encounters:   11/26/24 107 kg (236 lb)   11/06/24 103.4 kg (228 lb)   10/30/24 104.3 kg (230 lb)   10/24/24 106.6 kg (235 lb)   02/12/24 108.9 kg (240 lb)   08/05/23 106.6 kg (235 lb)       ECOG PS: 0    Physical Examination:  General: Patient is alert and oriented, not in acute distress  Psych: Mood and affect are appropriate  Eyes: EOMI, PERRL  ENT: Oropharynx is clear, no adenopathy  CV: Regular rate and rhythm, normal S1S2, no murmurs, no LE edema  Respiratory: Lungs clear to auscultation bilaterally  GI/Abd: Soft, non-tender with normoactive bowel sounds, no hepatosplenomegaly  Neurological: Grossly intact   Lymphatics: No palpable cervical, supraclavicular, axillary, or inguinal lymphadenopathy  Skin: no rashes or petechiae      Laboratory:  Lab Results   Component Value Date    WBC 10.8 10/25/2024    WBC 12.2 (H) 10/24/2024    WBC 12.4 (H) 06/20/2023    HGB 14.1 10/25/2024    HGB 14.9 10/24/2024    HGB 15.9 06/20/2023    HCT 39.8 10/25/2024    MCV 88.1 10/25/2024    MCH 31.2 10/25/2024    MCHC 35.4 10/25/2024    RDW 12.6 10/25/2024    .0 10/25/2024    .0 10/24/2024    .0 06/20/2023     Lab Results   Component Value Date     (H) 10/25/2024    BUN 13 10/25/2024    BUNCREA 15.9 10/25/2024    CREATSERUM 0.82 10/25/2024    CREATSERUM 0.93 10/24/2024    CREATSERUM 0.97 06/20/2023    ANIONGAP 7 10/25/2024    GFRNAA 76 05/02/2022    GFRAA 88 05/02/2022    CA 9.3 10/25/2024    OSMOCALC 283 10/25/2024    ALKPHO 60  10/24/2024    AST 20 10/24/2024    ALT 39 10/24/2024    ALKPHOS 38 04/01/2015    BILT 0.6 10/24/2024    TP 7.6 10/24/2024    ALB 4.7 10/24/2024    GLOBULIN 3.9 06/20/2023    AGRATIO 1.2 04/01/2015     10/25/2024    K 3.8 10/25/2024     10/25/2024    CO2 23.0 10/25/2024     Lab Results   Component Value Date    .3 (H) 10/25/2024    PT 12.7 04/15/2011    INR 0.9 04/15/2011       Imaging:     CT of the chest revealed ~ 1 cm right hilar LN     Impression & Plan:     64-year-old male with likely unprovoked pulmonary embolism along with left knee peroneal vein DVT.  Denies having any provoking factors.  No family history of venous thromboembolism.  No prior history of VTE.  No arterial thromboembolism.  Patient has quit smoking.  He plans to get more active physically.  -Up-to-date with colonoscopy.  No family history of having significant malignancy.  Noted to have small lymphadenopathy in the right hilar region.  He has a repeat CT of the chest pending.    The patient with the risk of recurrent thrombus within the first year is around 10.% Unprovoked VTE.  Has no prior history of bleeding, no liver disorder.  Low risk of bleeding.  Discussed with the patient that as long as he is able to tolerate anticoagulation.  Recommend indefinite long-term anticoagulation.    Based on [facility] trial, after at least 6-12 months  of therapeutic anticoagulation we could consider dose reducing to 10 mg of Xarelto.    Follow up CT Chest for Right hilar LN.     Liyah Dickinson MD  Hematology/Medical Oncology             [1]   Current Outpatient Medications on File Prior to Visit   Medication Sig Dispense Refill    rivaroxaban 20 MG Oral Tab Take 1 tablet (20 mg total) by mouth daily with food. 30 tablet 3    rivaroxaban 15 & 20 MG Oral Tablet Therapy Pack Take As Directed based on package instructions: Days 1-21: 15 mg by mouth twice daily Days 22-30: 20 mg by mouth once daily 1 each 0     nystatin-triamcinolone 100,000-0.1 Units/g-% External Ointment Apply twice daily  Monday-Friday to affected areas of rash. 60 g 3    atorvastatin 10 MG Oral Tab Take 1 tablet (10 mg total) by mouth nightly. 90 tablet 3    lisinopril 10 MG Oral Tab Take 1 tablet (10 mg total) by mouth daily. 90 tablet 3    clotrimazole-betamethasone 1-0.05 % External Cream APPLY EXTERNALLY TO THE AFFECTED AREA TWICE DAILY AS NEEDED 135 g 3    [] acetaminophen-codeine 300-30 MG Oral Tab Take 1-2 tablets by mouth every 4 (four) hours as needed for Pain. 20 tablet 0     No current facility-administered medications on file prior to visit.   [2] No Known Allergies

## 2025-01-03 ENCOUNTER — LAB ENCOUNTER (OUTPATIENT)
Dept: LAB | Facility: HOSPITAL | Age: 65
End: 2025-01-03
Attending: STUDENT IN AN ORGANIZED HEALTH CARE EDUCATION/TRAINING PROGRAM
Payer: MEDICARE

## 2025-01-03 DIAGNOSIS — I26.99 ACUTE PULMONARY EMBOLISM WITHOUT ACUTE COR PULMONALE, UNSPECIFIED PULMONARY EMBOLISM TYPE (HCC): ICD-10-CM

## 2025-01-03 LAB
ALBUMIN SERPL-MCNC: 4.9 G/DL (ref 3.2–4.8)
ALBUMIN/GLOB SERPL: 1.8 {RATIO} (ref 1–2)
ALP LIVER SERPL-CCNC: 43 U/L
ALT SERPL-CCNC: 41 U/L
ANION GAP SERPL CALC-SCNC: 3 MMOL/L (ref 0–18)
AST SERPL-CCNC: 22 U/L (ref ?–34)
BASOPHILS # BLD AUTO: 0.08 X10(3) UL (ref 0–0.2)
BASOPHILS NFR BLD AUTO: 0.9 %
BILIRUB SERPL-MCNC: 0.6 MG/DL (ref 0.2–1.1)
BUN BLD-MCNC: 14 MG/DL (ref 9–23)
BUN/CREAT SERPL: 13.9 (ref 10–20)
CALCIUM BLD-MCNC: 10.1 MG/DL (ref 8.7–10.4)
CHLORIDE SERPL-SCNC: 109 MMOL/L (ref 98–112)
CO2 SERPL-SCNC: 28 MMOL/L (ref 21–32)
CREAT BLD-MCNC: 1.01 MG/DL
DEPRECATED RDW RBC AUTO: 46.5 FL (ref 35.1–46.3)
EGFRCR SERPLBLD CKD-EPI 2021: 82 ML/MIN/1.73M2 (ref 60–?)
EOSINOPHIL # BLD AUTO: 0.32 X10(3) UL (ref 0–0.7)
EOSINOPHIL NFR BLD AUTO: 3.4 %
ERYTHROCYTE [DISTWIDTH] IN BLOOD BY AUTOMATED COUNT: 14.1 % (ref 11–15)
FASTING STATUS PATIENT QL REPORTED: YES
GLOBULIN PLAS-MCNC: 2.8 G/DL (ref 2–3.5)
GLUCOSE BLD-MCNC: 113 MG/DL (ref 70–99)
HCT VFR BLD AUTO: 44.2 %
HGB BLD-MCNC: 15.1 G/DL
IMM GRANULOCYTES # BLD AUTO: 0.03 X10(3) UL (ref 0–1)
IMM GRANULOCYTES NFR BLD: 0.3 %
LYMPHOCYTES # BLD AUTO: 2.32 X10(3) UL (ref 1–4)
LYMPHOCYTES NFR BLD AUTO: 25 %
MCH RBC QN AUTO: 30.7 PG (ref 26–34)
MCHC RBC AUTO-ENTMCNC: 34.2 G/DL (ref 31–37)
MCV RBC AUTO: 89.8 FL
MONOCYTES # BLD AUTO: 1.12 X10(3) UL (ref 0.1–1)
MONOCYTES NFR BLD AUTO: 12.1 %
NEUTROPHILS # BLD AUTO: 5.42 X10 (3) UL (ref 1.5–7.7)
NEUTROPHILS # BLD AUTO: 5.42 X10(3) UL (ref 1.5–7.7)
NEUTROPHILS NFR BLD AUTO: 58.3 %
OSMOLALITY SERPL CALC.SUM OF ELEC: 291 MOSM/KG (ref 275–295)
PLATELET # BLD AUTO: 256 10(3)UL (ref 150–450)
POTASSIUM SERPL-SCNC: 4.1 MMOL/L (ref 3.5–5.1)
PROT SERPL-MCNC: 7.7 G/DL (ref 5.7–8.2)
RBC # BLD AUTO: 4.92 X10(6)UL
SODIUM SERPL-SCNC: 140 MMOL/L (ref 136–145)
WBC # BLD AUTO: 9.3 X10(3) UL (ref 4–11)

## 2025-01-03 PROCEDURE — 36415 COLL VENOUS BLD VENIPUNCTURE: CPT

## 2025-01-03 PROCEDURE — 80053 COMPREHEN METABOLIC PANEL: CPT

## 2025-01-03 PROCEDURE — 85025 COMPLETE CBC W/AUTO DIFF WBC: CPT

## 2025-01-09 ENCOUNTER — HOSPITAL ENCOUNTER (OUTPATIENT)
Dept: CT IMAGING | Facility: HOSPITAL | Age: 65
Discharge: HOME | End: 2025-01-09
Attending: INTERNAL MEDICINE
Payer: MEDICARE

## 2025-01-09 DIAGNOSIS — I26.99 ACUTE PULMONARY EMBOLISM WITHOUT ACUTE COR PULMONALE, UNSPECIFIED PULMONARY EMBOLISM TYPE (HCC): ICD-10-CM

## 2025-01-09 PROCEDURE — 71260 CT THORAX DX C+: CPT | Performed by: INTERNAL MEDICINE

## 2025-03-03 ENCOUNTER — PATIENT MESSAGE (OUTPATIENT)
Dept: INTERNAL MEDICINE CLINIC | Facility: CLINIC | Age: 65
End: 2025-03-03

## 2025-03-03 NOTE — TELEPHONE ENCOUNTER
Refill Request for medication(s): Nystatin/triamcinolone ointment    Last Office Visit:  2/13/24    Last Refill: 2/14/24    Pharmacy, Dosage verified: [facility]    Condition Update (if applicable): Rx pended with 0 refills.  Ok to refill and advise appt?      Rx pended and sent to provider for approval, please advise. Thank You!

## 2025-03-04 RX ORDER — NYSTATIN AND TRIAMCINOLONE ACETONIDE 100000; 1 [USP'U]/G; MG/G
OINTMENT TOPICAL
Qty: 60 G | Refills: 0 | Status: SHIPPED | OUTPATIENT
Start: 2025-03-04

## 2025-03-04 NOTE — TELEPHONE ENCOUNTER
Please advise - the patient needs refills on the pended medications, sent to his new [facility] pharmacy.

## 2025-03-06 RX ORDER — ATORVASTATIN CALCIUM 10 MG/1
10 TABLET, FILM COATED ORAL NIGHTLY
Qty: 90 TABLET | Refills: 3 | Status: SHIPPED | OUTPATIENT
Start: 2025-03-06

## 2025-03-06 RX ORDER — LISINOPRIL 10 MG/1
10 TABLET ORAL DAILY
Qty: 90 TABLET | Refills: 3 | Status: SHIPPED | OUTPATIENT
Start: 2025-03-06

## 2025-03-06 NOTE — TELEPHONE ENCOUNTER
Please review; protocol failed/ has no protocol      Please see patients MyChart Message       Rj Marlow Rn Triage (supporting Frankie Yu MD)3 days ago       On January 1, 2025 my primary pharmacy changed from [facility] in [place] to [facility]. I've made the change in MyChart. [facility] informed me they have reached out a few times to have the prescriptions/approval sent to them. It is for Xarelto, Atorvastatin and Lisinopril. Thank you, Christopher

## 2025-05-15 ENCOUNTER — TELEPHONE (OUTPATIENT)
Dept: INTERNAL MEDICINE CLINIC | Facility: CLINIC | Age: 65
End: 2025-05-15

## 2025-05-15 NOTE — TELEPHONE ENCOUNTER
Patient's spouse calling  to request refill, verified [facility].     Current Outpatient Medications   Medication Sig Dispense Refill    rivaroxaban 20 MG Oral Tab Take 1 tablet (20 mg total) by mouth daily with food. 90 tablet 3

## 2025-05-15 NOTE — TELEPHONE ENCOUNTER
Patient's spouse calling to request orders ahead of visit on 0903/25.   
You can access the [facility] Patient Portal, offered by [facility], by registering with the following website: [email or web address]

## 2025-06-18 ENCOUNTER — OFFICE VISIT (OUTPATIENT)
Dept: DERMATOLOGY CLINIC | Facility: CLINIC | Age: 65
End: 2025-06-18
Payer: MEDICARE

## 2025-06-18 DIAGNOSIS — L81.4 LENTIGINES: ICD-10-CM

## 2025-06-18 DIAGNOSIS — L82.1 SEBORRHEIC KERATOSES: Primary | ICD-10-CM

## 2025-06-18 DIAGNOSIS — L30.1 DYSHIDROTIC ECZEMA: ICD-10-CM

## 2025-06-18 PROCEDURE — 99213 OFFICE O/P EST LOW 20 MIN: CPT | Performed by: STUDENT IN AN ORGANIZED HEALTH CARE EDUCATION/TRAINING PROGRAM

## 2025-06-18 NOTE — PROGRESS NOTES
Established Patient    Referred by: No referring provider defined for this encounter.    CHIEF COMPLAINT: Lesion of concern     HISTORY OF PRESENT ILLNESS: Rj Jasso is a 64 year old male here for evaluation of lesion of concern.    1. Growth   Location: Left temple  Duration: 1 week  Bleeding, growing, changing?: No  Scaly?:Yes  Itchy?:No    Current treatment: None  Past treatments: None    Personal Dermatologic History  History of skin cancer: No  History of  atypical moles: No    FAMILY HISTORY:  History of melanoma: No    Past Medical History  Past Medical History[1]    REVIEW OF SYSTEMS:  Constitutional: Denies fever, chills, unintentional weight loss.   Skin as per HPI    Medications  Current Medications[2]    PHYSICAL EXAM:  General: awake, alert, no acute distress  Neuropsych: appropriate mood and affect  Eyes: Sclerae anicteric, without conjunctival injection, eyelids unremarkable  Skin: Skin exam was performed today including the following: face. Pertinent findings include:   - temples with brown stuck on papules   - face with stellate brown macules     ASSESSMENT & PLAN:  Pathophysiology of diagnoses discussed with patient.  Therapeutic options reviewed. Risks, benefits, and alternatives discussed with patient. Instructions reviewed at length.    #Seborrheic Keratoses  - Reassured patient regarding benign nature of lesions. No treatment but observation at this time. Follow-up for concerning physical changes or new symptoms.    #Lentigines  - Discussed benign appearance and provided reassurance. No treatment but observation at this time. Follow-up for concerning physical changes or new symptoms.  - Recommend sun protection with spf 30 or higher, sun protective clothing such as wide brimmed hats and long sleeves. Recommend avoiding midday sun (10 am- 3 pm).     #Dyshidrotic eczema   - clobetasol 0.05% twice daily to affected areas Monday-Friday. Take weekends off. Avoid use on face, breasts, groin, or  axillae.      Return to clinic: as needed or sooner if something concerning arises     Pramod Haskins MD    By signing my name below, I, Georges FRAIRE MA,  attest that this documentation has been prepared under the direction and in the presence of Pramod Haskins MD.   Electronically Signed: Georges FRAIRE MA, 6/18/2025, 8:44 AM.    I, Pramod Haskins MD,  personally performed the services described in this documentation. All medical record entries made by the scribe were at my direction and in my presence.  I have reviewed the chart and agree that the record reflects my personal performance and is accurate and complete.  Pramod Haskins MD, 6/18/2025, 9:01 AM           [1]   Past Medical History:   Diverticulosis    Essential hypertension    Nephrolithiasis   [2]   Current Outpatient Medications   Medication Sig Dispense Refill    lisinopril 10 MG Oral Tab Take 1 tablet (10 mg total) by mouth daily. 90 tablet 3    atorvastatin 10 MG Oral Tab Take 1 tablet (10 mg total) by mouth nightly. 90 tablet 3    rivaroxaban 20 MG Oral Tab Take 1 tablet (20 mg total) by mouth daily with food. 90 tablet 3    nystatin-triamcinolone 100,000-0.1 Units/g-% External Ointment Apply twice daily  Monday-Friday to affected areas of rash. 60 g 0    rivaroxaban 15 & 20 MG Oral Tablet Therapy Pack Take As Directed based on package instructions: Days 1-21: 15 mg by mouth twice daily Days 22-30: 20 mg by mouth once daily 1 each 0    clotrimazole-betamethasone 1-0.05 % External Cream APPLY EXTERNALLY TO THE AFFECTED AREA TWICE DAILY AS NEEDED 135 g 3

## 2025-07-01 RX ORDER — CLOBETASOL PROPIONATE 0.5 MG/G
1 CREAM TOPICAL 2 TIMES DAILY
Qty: 60 G | Refills: 2 | Status: SHIPPED | OUTPATIENT
Start: 2025-07-01

## 2025-07-01 NOTE — TELEPHONE ENCOUNTER
Dr Haskins, patient requesting clobetasol. Doesn't look like it was sent to [facility] at [facility]. Pended for 60g cream. Please review and send.

## 2025-07-15 ENCOUNTER — TELEPHONE (OUTPATIENT)
Facility: CLINIC | Age: 65
End: 2025-07-15

## 2025-08-27 ENCOUNTER — LAB ENCOUNTER (OUTPATIENT)
Dept: LAB | Facility: HOSPITAL | Age: 65
End: 2025-08-27
Attending: STUDENT IN AN ORGANIZED HEALTH CARE EDUCATION/TRAINING PROGRAM

## 2025-08-27 PROCEDURE — 36415 COLL VENOUS BLD VENIPUNCTURE: CPT | Performed by: INTERNAL MEDICINE

## 2025-08-27 PROCEDURE — 84443 ASSAY THYROID STIM HORMONE: CPT | Performed by: INTERNAL MEDICINE

## 2025-08-27 PROCEDURE — 83036 HEMOGLOBIN GLYCOSYLATED A1C: CPT | Performed by: INTERNAL MEDICINE

## 2025-08-27 PROCEDURE — 80053 COMPREHEN METABOLIC PANEL: CPT | Performed by: INTERNAL MEDICINE

## 2025-08-27 PROCEDURE — 85025 COMPLETE CBC W/AUTO DIFF WBC: CPT | Performed by: INTERNAL MEDICINE

## 2025-08-27 PROCEDURE — 80061 LIPID PANEL: CPT | Performed by: INTERNAL MEDICINE

## (undated) NOTE — Clinical Note
TCM assessment completed.  The patient is scheduled for a TCM/HFU with you on 11/06/2024.  Thank you.

## (undated) NOTE — LETTER
8/8/2023          To Whom It May Concern:    Hayden Mancera is currently under my medical care and may not return to work at this time. He may return to work on 08/15/23. Activity is restricted as follows: no lifting greater than 10lbs, no squatting, no latter for 4 weeks. If you require additional information please contact our office.         Sincerely,    Ritchie Gottron, MD